# Patient Record
Sex: FEMALE | Race: BLACK OR AFRICAN AMERICAN | NOT HISPANIC OR LATINO | URBAN - METROPOLITAN AREA
[De-identification: names, ages, dates, MRNs, and addresses within clinical notes are randomized per-mention and may not be internally consistent; named-entity substitution may affect disease eponyms.]

---

## 2020-06-06 ENCOUNTER — INPATIENT (INPATIENT)
Facility: HOSPITAL | Age: 80
LOS: 5 days | Discharge: HOME | DRG: 393 | End: 2020-06-12
Attending: GENERAL PRACTICE | Admitting: GENERAL PRACTICE
Payer: MEDICARE

## 2020-06-06 VITALS
RESPIRATION RATE: 16 BRPM | SYSTOLIC BLOOD PRESSURE: 136 MMHG | WEIGHT: 169.98 LBS | OXYGEN SATURATION: 100 % | DIASTOLIC BLOOD PRESSURE: 97 MMHG | HEART RATE: 77 BPM | HEIGHT: 66 IN

## 2020-06-06 DIAGNOSIS — I24.9 ACUTE ISCHEMIC HEART DISEASE, UNSPECIFIED: ICD-10-CM

## 2020-06-06 LAB
ALBUMIN SERPL ELPH-MCNC: 2.9 G/DL — LOW (ref 3.5–5)
ALP SERPL-CCNC: 44 U/L — SIGNIFICANT CHANGE UP (ref 40–120)
ALT FLD-CCNC: 12 U/L DA — SIGNIFICANT CHANGE UP (ref 10–60)
ANION GAP SERPL CALC-SCNC: 12 MMOL/L — SIGNIFICANT CHANGE UP (ref 5–17)
APTT BLD: 37.2 SEC — HIGH (ref 27.5–36.3)
AST SERPL-CCNC: 12 U/L — SIGNIFICANT CHANGE UP (ref 10–40)
BASOPHILS # BLD AUTO: 0 K/UL — SIGNIFICANT CHANGE UP (ref 0–0.2)
BASOPHILS NFR BLD AUTO: 0 % — SIGNIFICANT CHANGE UP (ref 0–2)
BILIRUB SERPL-MCNC: 0.7 MG/DL — SIGNIFICANT CHANGE UP (ref 0.2–1.2)
BUN SERPL-MCNC: 27 MG/DL — HIGH (ref 7–18)
CALCIUM SERPL-MCNC: 7.9 MG/DL — LOW (ref 8.4–10.5)
CHLORIDE SERPL-SCNC: 112 MMOL/L — HIGH (ref 96–108)
CO2 SERPL-SCNC: 18 MMOL/L — LOW (ref 22–31)
CREAT SERPL-MCNC: 1.42 MG/DL — HIGH (ref 0.5–1.3)
EOSINOPHIL # BLD AUTO: 0.07 K/UL — SIGNIFICANT CHANGE UP (ref 0–0.5)
EOSINOPHIL NFR BLD AUTO: 1 % — SIGNIFICANT CHANGE UP (ref 0–6)
GLUCOSE SERPL-MCNC: 108 MG/DL — HIGH (ref 70–99)
HCT VFR BLD CALC: 15.5 % — CRITICAL LOW (ref 34.5–45)
HGB BLD-MCNC: 4.6 G/DL — CRITICAL LOW (ref 11.5–15.5)
INR BLD: 1.66 RATIO — HIGH (ref 0.88–1.16)
LYMPHOCYTES # BLD AUTO: 1.33 K/UL — SIGNIFICANT CHANGE UP (ref 1–3.3)
LYMPHOCYTES # BLD AUTO: 18 % — SIGNIFICANT CHANGE UP (ref 13–44)
MCHC RBC-ENTMCNC: 27.4 PG — SIGNIFICANT CHANGE UP (ref 27–34)
MCHC RBC-ENTMCNC: 29.7 GM/DL — LOW (ref 32–36)
MCV RBC AUTO: 92.3 FL — SIGNIFICANT CHANGE UP (ref 80–100)
MONOCYTES # BLD AUTO: 0.37 K/UL — SIGNIFICANT CHANGE UP (ref 0–0.9)
MONOCYTES NFR BLD AUTO: 5 % — SIGNIFICANT CHANGE UP (ref 2–14)
NEUTROPHILS # BLD AUTO: 5.62 K/UL — SIGNIFICANT CHANGE UP (ref 1.8–7.4)
NEUTROPHILS NFR BLD AUTO: 75 % — SIGNIFICANT CHANGE UP (ref 43–77)
OB PNL STL: POSITIVE
PLATELET # BLD AUTO: 213 K/UL — SIGNIFICANT CHANGE UP (ref 150–400)
POTASSIUM SERPL-MCNC: 3.6 MMOL/L — SIGNIFICANT CHANGE UP (ref 3.5–5.3)
POTASSIUM SERPL-SCNC: 3.6 MMOL/L — SIGNIFICANT CHANGE UP (ref 3.5–5.3)
PROT SERPL-MCNC: 6.8 G/DL — SIGNIFICANT CHANGE UP (ref 6–8.3)
PROTHROM AB SERPL-ACNC: 19 SEC — HIGH (ref 10–12.9)
RBC # BLD: 1.68 M/UL — LOW (ref 3.8–5.2)
RBC # FLD: 20.5 % — HIGH (ref 10.3–14.5)
SODIUM SERPL-SCNC: 142 MMOL/L — SIGNIFICANT CHANGE UP (ref 135–145)
TROPONIN I SERPL-MCNC: 0.05 NG/ML — HIGH (ref 0–0.04)
WBC # BLD: 7.4 K/UL — SIGNIFICANT CHANGE UP (ref 3.8–10.5)
WBC # FLD AUTO: 7.4 K/UL — SIGNIFICANT CHANGE UP (ref 3.8–10.5)

## 2020-06-06 PROCEDURE — 71045 X-RAY EXAM CHEST 1 VIEW: CPT | Mod: 26

## 2020-06-06 PROCEDURE — 70450 CT HEAD/BRAIN W/O DYE: CPT | Mod: 26

## 2020-06-06 PROCEDURE — 99291 CRITICAL CARE FIRST HOUR: CPT

## 2020-06-06 RX ORDER — PANTOPRAZOLE SODIUM 20 MG/1
40 TABLET, DELAYED RELEASE ORAL ONCE
Refills: 0 | Status: COMPLETED | OUTPATIENT
Start: 2020-06-06 | End: 2020-06-06

## 2020-06-06 RX ORDER — PANTOPRAZOLE SODIUM 20 MG/1
40 TABLET, DELAYED RELEASE ORAL
Refills: 0 | Status: DISCONTINUED | OUTPATIENT
Start: 2020-06-06 | End: 2020-06-09

## 2020-06-06 RX ADMIN — PANTOPRAZOLE SODIUM 40 MILLIGRAM(S): 20 TABLET, DELAYED RELEASE ORAL at 22:08

## 2020-06-06 NOTE — H&P ADULT - PROBLEM SELECTOR PLAN 1
p/w generalized weakness with dizziness  H/H 4.6/15.5, black stool on rectal exam, FOBT positive, pt. on eliquis  INR 1.66  likely anemia due to UGIB  on  clears   IV Protonix BID  will Transfuse 2 units of [facility]  CBC q6  GI Consult Dr Henry p/w generalized weakness with dizziness  H/H 4.6/15.5, black stool on rectal exam, FOBT positive, pt. on eliquis  INR 1.66  no recent use of steroid, NSAID, weight loss  likely anemia due to UGIB  on  clears   IV Protonix BID  will Transfuse 2 units of [facility]  CBC q6  GI Consult Dr Henry p/w generalized weakness with dizziness  H/H 4.6/15.5, black stool on rectal exam, FOBT positive, pt. on eliquis  INR 1.66  no recent use of steroid, NSAID, weight loss  likely anemia due to UGIB  on  clears   IV Protonix BID  will Transfuse 2 units of [facility]  CBC q6  GI Consult Dr Romano

## 2020-06-06 NOTE — ED PROVIDER NOTE - PROGRESS NOTE DETAILS
Pt with gi bleed, anemia, abnormal ekg with elevated trop, will transfuse and admit. Pt had slurred speech that has resolved, likely related to anemia/weakness, on eliquis, so not TPA candidate.

## 2020-06-06 NOTE — H&P ADULT - PROBLEM SELECTOR PLAN 8
IMPROVE VTE Individual Risk Assessment  RISK                                                                Points  [  ] Previous VTE                                                  3  [  ] Thrombophilia                                               2  [  ] Lower limb paralysis                                      2        (unable to hold up >15 seconds)    [  ] Current Cancer                                              2         (within 6 months)  [x  ] Immobilization > 24 hrs                                1  [  ] ICU/CCU stay > 24 hours                              1  [x  ] Age > 60                                                      1  IMPROVE VTE Score _________2, -hold DVT proph due to anemia, SCDs

## 2020-06-06 NOTE — H&P ADULT - PROBLEM SELECTOR PLAN 6
Home meds: multaq, diltiazem  hold meds due to low Hb  monitor BP and adjust meds Home meds: multaq, diltiazem  c/w multaq and diltiazem with parameters  monitor BP and adjust meds

## 2020-06-06 NOTE — H&P ADULT - NSHPPHYSICALEXAM_GEN_ALL_CORE
Vital Signs Last 24 Hrs  T(C): --  T(F): --  HR: 76 (06 Jun 2020 21:59) (76 - 77)  BP: 145/65 (06 Jun 2020 21:59) (136/97 - 145/65)  BP(mean): --  RR: 20 (06 Jun 2020 21:59) (16 - 20)  SpO2: 98% (06 Jun 2020 21:59) (98% - 100%)    PHYSICAL EXAM:  GENERAL: NAD  HEENT: Normocephalic;  conjunctivae and sclerae clear; moist mucous membranes;   NECK : supple  CHEST/LUNG: Clear to auscultation bilaterally with good air entry   HEART: S1 S2  regular; no murmurs, gallops or rubs  ABDOMEN: Soft, Nontender, Nondistended; Bowel sounds present  EXTREMITIES: no cyanosis; no edema; no calf tenderness  SKIN: warm and dry; no rash  NERVOUS SYSTEM:  Awake and alert; Oriented  to place and person ; no new deficits

## 2020-06-06 NOTE — H&P ADULT - HISTORY OF PRESENT ILLNESS
80F from home, lives alone, PMH of short-term memory loss, A.fib, HTN, HLD presented to ED c/o weakness. Pt. is AAO X2 and complaints of generalized weakness and dizziness on walking. States she is always anemic and ha snot noticed any active bleeding or black stool. Also states that she is not taking any medications since long time. She endorses occasional shortness of breath and states she has had since childhood.  Spoke to daughter, Christina over phone. She lives in the same building with her mother and takes care of her, she gives her medications regularly. She states pt. was not feeling good since yesterday with loss of appetite and generalized weakness, which progressively got worst today, hence she called ambulance. When EMS arrived, they were suspicious of slurring of speech but daughter mentions that she did not have any slurring of speech, she was just weak. 80F from home, lives alone, PMH of short-term memory loss, A.fib, HTN, HLD presented to ED c/o weakness. Pt. is AAO X2 and complaints of generalized weakness and dizziness on walking. States she is always anemic and ha snot noticed any active bleeding or black stool. Also states that she is not taking any medications since long time. She endorses occasional shortness of breath and states she has had since childhood.  Spoke to daughter, Christina over phone. She lives in the same building with her mother and takes care of her, she gives her medications regularly. She states pt. was not feeling good since yesterday with loss of appetite and generalized weakness, which progressively got worst today, hence she called ambulance. When EMS arrived, they were suspicious of slurring of speech but daughter mentions that she did not have any slurring of speech, she was just weak. On further questioning, she states she did notice dark stool X 1 epsiode today, otherwise no prior active bleeding or black stool. Denies any numbness, tingling, weakness of extremities, difficulty swallowing, urinary complaints. 80F from home, lives alone, PMH of short-term memory loss, A.fib, HTN, HLD presented to ED c/o weakness. Pt. is AAO X2 and complaints of generalized weakness and dizziness on walking. States she is always anemic and has not noticed any active bleeding or black stool. Also states that she is not taking any medications since long time. She endorses occasional shortness of breath and states she has had since childhood.  Spoke to daughter, Christina over phone. She lives in the same building with her mother and takes care of her, she gives her medications regularly. She states pt. was not feeling good since yesterday with loss of appetite and generalized weakness, which progressively got worst today, hence she called ambulance. When EMS arrived, they were suspicious of slurring of speech but daughter mentions that she did not have any slurring of speech, she was just weak. On further questioning, she states she did notice dark stool X 1 epsiode today, otherwise no prior active bleeding or black stool. Denies any numbness, tingling, weakness of extremities, difficulty swallowing, urinary complaints.

## 2020-06-06 NOTE — ED PROVIDER NOTE - CARE PLAN
Principal Discharge DX:	ACS (acute coronary syndrome)  Secondary Diagnosis:	Anemia  Secondary Diagnosis:	TIA (transient ischemic attack)

## 2020-06-06 NOTE — ED PROVIDER NOTE - CLINICAL SUMMARY MEDICAL DECISION MAKING FREE TEXT BOX
79 y/o F presents with stroke like symptoms. Symptoms have improved. Patient not a tPA candidate due to improving symptoms and on Eliquis. Stroke code initiated.

## 2020-06-06 NOTE — H&P ADULT - PROBLEM SELECTOR PLAN 3
p/w BUN/Cr 27/1.42  daughter denies any renal issue before  likely pre-renal due to anemia  will transfuse PRBC  FU urine lytes, renal function

## 2020-06-06 NOTE — ED PROVIDER NOTE - OBJECTIVE STATEMENT
79 y/o F with a significant PMHx of atrial fibrillation, HTN, HLD and DM is brought in by EMS to the ED for dizziness and weakness all day. Per EMS, daughter states that the patient had slurred speech, which has since resolved. Patient on Eliquis, however is not compliant with medications and has been "fuzzy about taking them". Patient with decreased appetite for the past 2 days and is A&O x2 at baseline.

## 2020-06-06 NOTE — H&P ADULT - ASSESSMENT
80F from home, lives alone, PMH of short-term memory loss, A.fib, HTN, HLD presented to ED c/o weakness. Admitted for severe symptomatic anemia likely 2/2 UGIB, ARF, NSTEMI.    ED course: Vitals: BP: 145/65 HR: 76  RR: 20 SaO2: 98% on RA  Labs significant for H/H 4.6/15.5, FOBT positive, INR 1.66, BUN/Cr 27/1.42, T1 0.053  Radiological findings- CT head shows No acute intracranial hemorrhage or vasogenic edema. Grossly stable mild chronic microvascular changes.  s/p protonix 40  GOC: FULL CODE 80F from home, lives alone, PMH of short-term memory loss, A.fib, HTN, HLD presented to ED c/o weakness. Admitted for severe symptomatic anemia likely 2/2 UGIB, ARF, NSTEMI.    ED course: Vitals: BP: 145/65 HR: 76  RR: 20 SaO2: 98% on RA  Labs significant for H/H 4.6/15.5, FOBT positive, INR 1.66, BUN/Cr 27/1.42, T1 0.053  Radiological findings- CT head shows No acute intracranial hemorrhage or vasogenic edema. Grossly stable mild chronic microvascular changes.  s/p protonix 40  GOC: FULL CODE    Off note, code stroke was called in ED as EMS was concerned for slurring of speech but as per daughter, pt. did not have slurred speech and was only weak.

## 2020-06-06 NOTE — H&P ADULT - NSICDXPASTMEDICALHX_GEN_ALL_CORE_FT
PAST MEDICAL HISTORY:  Atrial fibrillation     DM (diabetes mellitus)     HLD (hyperlipidemia)     HTN (hypertension)

## 2020-06-06 NOTE — H&P ADULT - ATTENDING COMMENTS
Patient evaluated, case discussed with me, chart reviewed, agree with above H/P as reviewed and edited by me.  Dr. DAKOTA Coulter ([phone])

## 2020-06-06 NOTE — H&P ADULT - PROBLEM SELECTOR PLAN 4
EKG shows NSR with 1st degree block, QTc 483, ST depression with T inv. in V4, V5, V6  likely due Pt. asymptomatic  T1 0.053  EKG shows NSR with 1st degree block, QTc 483, ST depression with T inv. in V4, V5, V6  likely due to severe anemia  tele monitor  will hold anti-platelet due to low Hb with FOBT positive  FU T2, T3  FU ECHO   Cardio consult Dr. Henry Pt. asymptomatic  T1 0.053  EKG shows NSR with 1st degree block, QTc 483, ST depression with T inv. in V4, V5, V6  likely due to severe anemia  tele monitor  will hold anti-platelet due to low Hb with FOBT positive  FU T2, T3  FU ECHO   Cardio consult Dr. Henry  will repeat EKG after transfusion Pt. asymptomatic  T1 0.053  EKG shows NSR with 1st degree block, QTc 483, ST depression with T inv. in V4, V5, V6  likely due to severe anemia  tele monitor  will hold anti-platelet due to low Hb with FOBT positive  FU T2, T3  FU ECHO

## 2020-06-07 DIAGNOSIS — I21.4 NON-ST ELEVATION (NSTEMI) MYOCARDIAL INFARCTION: ICD-10-CM

## 2020-06-07 DIAGNOSIS — R19.5 OTHER FECAL ABNORMALITIES: ICD-10-CM

## 2020-06-07 DIAGNOSIS — E78.5 HYPERLIPIDEMIA, UNSPECIFIED: ICD-10-CM

## 2020-06-07 DIAGNOSIS — Z29.9 ENCOUNTER FOR PROPHYLACTIC MEASURES, UNSPECIFIED: ICD-10-CM

## 2020-06-07 DIAGNOSIS — I10 ESSENTIAL (PRIMARY) HYPERTENSION: ICD-10-CM

## 2020-06-07 DIAGNOSIS — D64.9 ANEMIA, UNSPECIFIED: ICD-10-CM

## 2020-06-07 DIAGNOSIS — N17.9 ACUTE KIDNEY FAILURE, UNSPECIFIED: ICD-10-CM

## 2020-06-07 DIAGNOSIS — I48.91 UNSPECIFIED ATRIAL FIBRILLATION: ICD-10-CM

## 2020-06-07 LAB
24R-OH-CALCIDIOL SERPL-MCNC: 12.6 NG/ML — LOW (ref 30–80)
A1C WITH ESTIMATED AVERAGE GLUCOSE RESULT: 5.4 % — SIGNIFICANT CHANGE UP (ref 4–5.6)
ALBUMIN SERPL ELPH-MCNC: 3 G/DL — LOW (ref 3.5–5)
ALP SERPL-CCNC: 43 U/L — SIGNIFICANT CHANGE UP (ref 40–120)
ALT FLD-CCNC: 11 U/L DA — SIGNIFICANT CHANGE UP (ref 10–60)
ANION GAP SERPL CALC-SCNC: 11 MMOL/L — SIGNIFICANT CHANGE UP (ref 5–17)
APPEARANCE UR: CLEAR — SIGNIFICANT CHANGE UP
APTT BLD: 32 SEC — SIGNIFICANT CHANGE UP (ref 27.5–36.3)
AST SERPL-CCNC: 13 U/L — SIGNIFICANT CHANGE UP (ref 10–40)
BASOPHILS # BLD AUTO: 0.03 K/UL — SIGNIFICANT CHANGE UP (ref 0–0.2)
BASOPHILS # BLD AUTO: 0.04 K/UL — SIGNIFICANT CHANGE UP (ref 0–0.2)
BASOPHILS NFR BLD AUTO: 0.4 % — SIGNIFICANT CHANGE UP (ref 0–2)
BASOPHILS NFR BLD AUTO: 0.5 % — SIGNIFICANT CHANGE UP (ref 0–2)
BILIRUB SERPL-MCNC: 0.9 MG/DL — SIGNIFICANT CHANGE UP (ref 0.2–1.2)
BILIRUB UR-MCNC: NEGATIVE — SIGNIFICANT CHANGE UP
BUN SERPL-MCNC: 22 MG/DL — HIGH (ref 7–18)
CALCIUM SERPL-MCNC: 7.9 MG/DL — LOW (ref 8.4–10.5)
CHLORIDE SERPL-SCNC: 110 MMOL/L — HIGH (ref 96–108)
CHOLEST SERPL-MCNC: 76 MG/DL — SIGNIFICANT CHANGE UP (ref 10–199)
CK MB BLD-MCNC: 0.6 % — SIGNIFICANT CHANGE UP (ref 0–3.5)
CK MB CFR SERPL CALC: 1.6 NG/ML — SIGNIFICANT CHANGE UP (ref 0–3.6)
CK SERPL-CCNC: 264 U/L — HIGH (ref 21–215)
CO2 SERPL-SCNC: 19 MMOL/L — LOW (ref 22–31)
COLOR SPEC: YELLOW — SIGNIFICANT CHANGE UP
CREAT ?TM UR-MCNC: 145 MG/DL — SIGNIFICANT CHANGE UP
CREAT SERPL-MCNC: 1.37 MG/DL — HIGH (ref 0.5–1.3)
DIFF PNL FLD: NEGATIVE — SIGNIFICANT CHANGE UP
EOSINOPHIL # BLD AUTO: 0.04 K/UL — SIGNIFICANT CHANGE UP (ref 0–0.5)
EOSINOPHIL # BLD AUTO: 0.19 K/UL — SIGNIFICANT CHANGE UP (ref 0–0.5)
EOSINOPHIL NFR BLD AUTO: 0.6 % — SIGNIFICANT CHANGE UP (ref 0–6)
EOSINOPHIL NFR BLD AUTO: 2.5 % — SIGNIFICANT CHANGE UP (ref 0–6)
ESTIMATED AVERAGE GLUCOSE: 108 MG/DL — SIGNIFICANT CHANGE UP (ref 68–114)
FERRITIN SERPL-MCNC: 10 NG/ML — LOW (ref 15–150)
GLUCOSE SERPL-MCNC: 135 MG/DL — HIGH (ref 70–99)
GLUCOSE UR QL: NEGATIVE — SIGNIFICANT CHANGE UP
HCT VFR BLD CALC: 21.9 % — LOW (ref 34.5–45)
HCT VFR BLD CALC: 23.1 % — LOW (ref 34.5–45)
HCT VFR BLD CALC: 25 % — LOW (ref 34.5–45)
HDLC SERPL-MCNC: 37 MG/DL — LOW
HGB BLD-MCNC: 6.9 G/DL — CRITICAL LOW (ref 11.5–15.5)
HGB BLD-MCNC: 7.2 G/DL — LOW (ref 11.5–15.5)
HGB BLD-MCNC: 8 G/DL — LOW (ref 11.5–15.5)
IMM GRANULOCYTES NFR BLD AUTO: 0.3 % — SIGNIFICANT CHANGE UP (ref 0–1.5)
IMM GRANULOCYTES NFR BLD AUTO: 0.4 % — SIGNIFICANT CHANGE UP (ref 0–1.5)
INR BLD: 1.36 RATIO — HIGH (ref 0.88–1.16)
IRON SATN MFR SERPL: 25 UG/DL — LOW (ref 40–150)
IRON SATN MFR SERPL: 9 % — LOW (ref 15–50)
KETONES UR-MCNC: NEGATIVE — SIGNIFICANT CHANGE UP
LEUKOCYTE ESTERASE UR-ACNC: NEGATIVE — SIGNIFICANT CHANGE UP
LIPID PNL WITH DIRECT LDL SERPL: 25 MG/DL — SIGNIFICANT CHANGE UP
LYMPHOCYTES # BLD AUTO: 1.8 K/UL — SIGNIFICANT CHANGE UP (ref 1–3.3)
LYMPHOCYTES # BLD AUTO: 1.84 K/UL — SIGNIFICANT CHANGE UP (ref 1–3.3)
LYMPHOCYTES # BLD AUTO: 24.4 % — SIGNIFICANT CHANGE UP (ref 13–44)
LYMPHOCYTES # BLD AUTO: 26 % — SIGNIFICANT CHANGE UP (ref 13–44)
MAGNESIUM SERPL-MCNC: 2.6 MG/DL — SIGNIFICANT CHANGE UP (ref 1.6–2.6)
MCHC RBC-ENTMCNC: 28 PG — SIGNIFICANT CHANGE UP (ref 27–34)
MCHC RBC-ENTMCNC: 28.1 PG — SIGNIFICANT CHANGE UP (ref 27–34)
MCHC RBC-ENTMCNC: 28.7 PG — SIGNIFICANT CHANGE UP (ref 27–34)
MCHC RBC-ENTMCNC: 31.2 GM/DL — LOW (ref 32–36)
MCHC RBC-ENTMCNC: 31.5 GM/DL — LOW (ref 32–36)
MCHC RBC-ENTMCNC: 32 GM/DL — SIGNIFICANT CHANGE UP (ref 32–36)
MCV RBC AUTO: 89 FL — SIGNIFICANT CHANGE UP (ref 80–100)
MCV RBC AUTO: 89.6 FL — SIGNIFICANT CHANGE UP (ref 80–100)
MCV RBC AUTO: 90.2 FL — SIGNIFICANT CHANGE UP (ref 80–100)
MONOCYTES # BLD AUTO: 0.32 K/UL — SIGNIFICANT CHANGE UP (ref 0–0.9)
MONOCYTES # BLD AUTO: 0.66 K/UL — SIGNIFICANT CHANGE UP (ref 0–0.9)
MONOCYTES NFR BLD AUTO: 4.6 % — SIGNIFICANT CHANGE UP (ref 2–14)
MONOCYTES NFR BLD AUTO: 8.8 % — SIGNIFICANT CHANGE UP (ref 2–14)
NEUTROPHILS # BLD AUTO: 4.7 K/UL — SIGNIFICANT CHANGE UP (ref 1.8–7.4)
NEUTROPHILS # BLD AUTO: 4.79 K/UL — SIGNIFICANT CHANGE UP (ref 1.8–7.4)
NEUTROPHILS NFR BLD AUTO: 63.5 % — SIGNIFICANT CHANGE UP (ref 43–77)
NEUTROPHILS NFR BLD AUTO: 68 % — SIGNIFICANT CHANGE UP (ref 43–77)
NITRITE UR-MCNC: NEGATIVE — SIGNIFICANT CHANGE UP
NRBC # BLD: 0 /100 WBCS — SIGNIFICANT CHANGE UP (ref 0–0)
PH UR: 5 — SIGNIFICANT CHANGE UP (ref 5–8)
PHOSPHATE SERPL-MCNC: 2.4 MG/DL — LOW (ref 2.5–4.5)
PLATELET # BLD AUTO: 165 K/UL — SIGNIFICANT CHANGE UP (ref 150–400)
PLATELET # BLD AUTO: 172 K/UL — SIGNIFICANT CHANGE UP (ref 150–400)
PLATELET # BLD AUTO: 173 K/UL — SIGNIFICANT CHANGE UP (ref 150–400)
POTASSIUM SERPL-MCNC: 3.1 MMOL/L — LOW (ref 3.5–5.3)
POTASSIUM SERPL-SCNC: 3.1 MMOL/L — LOW (ref 3.5–5.3)
PROT SERPL-MCNC: 6.6 G/DL — SIGNIFICANT CHANGE UP (ref 6–8.3)
PROT UR-MCNC: 30 MG/DL
PROTHROM AB SERPL-ACNC: 15.5 SEC — HIGH (ref 10–12.9)
RBC # BLD: 2.46 M/UL — LOW (ref 3.8–5.2)
RBC # BLD: 2.56 M/UL — LOW (ref 3.8–5.2)
RBC # BLD: 2.79 M/UL — LOW (ref 3.8–5.2)
RBC # FLD: 17.7 % — HIGH (ref 10.3–14.5)
RBC # FLD: 17.7 % — HIGH (ref 10.3–14.5)
RBC # FLD: 17.9 % — HIGH (ref 10.3–14.5)
SARS-COV-2 RNA SPEC QL NAA+PROBE: SIGNIFICANT CHANGE UP
SODIUM SERPL-SCNC: 140 MMOL/L — SIGNIFICANT CHANGE UP (ref 135–145)
SODIUM UR-SCNC: 21 MMOL/L — SIGNIFICANT CHANGE UP
SP GR SPEC: 1.02 — SIGNIFICANT CHANGE UP (ref 1.01–1.02)
TIBC SERPL-MCNC: 289 UG/DL — SIGNIFICANT CHANGE UP (ref 250–450)
TOTAL CHOLESTEROL/HDL RATIO MEASUREMENT: 2.1 RATIO — LOW (ref 3.3–7.1)
TRIGL SERPL-MCNC: 69 MG/DL — SIGNIFICANT CHANGE UP (ref 10–149)
TROPONIN I SERPL-MCNC: 0.05 NG/ML — HIGH (ref 0–0.04)
TSH SERPL-MCNC: 0.98 UU/ML — SIGNIFICANT CHANGE UP (ref 0.34–4.82)
UIBC SERPL-MCNC: 264 UG/DL — SIGNIFICANT CHANGE UP (ref 110–370)
UROBILINOGEN FLD QL: NEGATIVE — SIGNIFICANT CHANGE UP
VIT B12 SERPL-MCNC: 357 PG/ML — SIGNIFICANT CHANGE UP (ref 232–1245)
WBC # BLD: 6.92 K/UL — SIGNIFICANT CHANGE UP (ref 3.8–10.5)
WBC # BLD: 7.16 K/UL — SIGNIFICANT CHANGE UP (ref 3.8–10.5)
WBC # BLD: 7.54 K/UL — SIGNIFICANT CHANGE UP (ref 3.8–10.5)
WBC # FLD AUTO: 6.92 K/UL — SIGNIFICANT CHANGE UP (ref 3.8–10.5)
WBC # FLD AUTO: 7.16 K/UL — SIGNIFICANT CHANGE UP (ref 3.8–10.5)
WBC # FLD AUTO: 7.54 K/UL — SIGNIFICANT CHANGE UP (ref 3.8–10.5)

## 2020-06-07 RX ORDER — DILTIAZEM HCL 120 MG
180 CAPSULE, EXT RELEASE 24 HR ORAL DAILY
Refills: 0 | Status: DISCONTINUED | OUTPATIENT
Start: 2020-06-07 | End: 2020-06-09

## 2020-06-07 RX ORDER — POTASSIUM CHLORIDE 20 MEQ
40 PACKET (EA) ORAL EVERY 4 HOURS
Refills: 0 | Status: COMPLETED | OUTPATIENT
Start: 2020-06-07 | End: 2020-06-07

## 2020-06-07 RX ORDER — DILTIAZEM HCL 120 MG
360 CAPSULE, EXT RELEASE 24 HR ORAL DAILY
Refills: 0 | Status: DISCONTINUED | OUTPATIENT
Start: 2020-06-07 | End: 2020-06-07

## 2020-06-07 RX ORDER — ATORVASTATIN CALCIUM 80 MG/1
80 TABLET, FILM COATED ORAL AT BEDTIME
Refills: 0 | Status: DISCONTINUED | OUTPATIENT
Start: 2020-06-07 | End: 2020-06-09

## 2020-06-07 RX ORDER — SODIUM,POTASSIUM PHOSPHATES 278-250MG
1 POWDER IN PACKET (EA) ORAL
Refills: 0 | Status: COMPLETED | OUTPATIENT
Start: 2020-06-07 | End: 2020-06-08

## 2020-06-07 RX ORDER — DRONEDARONE 400 MG/1
400 TABLET, FILM COATED ORAL
Refills: 0 | Status: DISCONTINUED | OUTPATIENT
Start: 2020-06-07 | End: 2020-06-07

## 2020-06-07 RX ORDER — ERGOCALCIFEROL 1.25 MG/1
50000 CAPSULE ORAL
Refills: 0 | Status: DISCONTINUED | OUTPATIENT
Start: 2020-06-07 | End: 2020-06-09

## 2020-06-07 RX ORDER — DRONEDARONE 400 MG/1
400 TABLET, FILM COATED ORAL
Refills: 0 | Status: DISCONTINUED | OUTPATIENT
Start: 2020-06-07 | End: 2020-06-09

## 2020-06-07 RX ADMIN — Medication 40 MILLIEQUIVALENT(S): at 15:08

## 2020-06-07 RX ADMIN — DRONEDARONE 400 MILLIGRAM(S): 400 TABLET, FILM COATED ORAL at 17:29

## 2020-06-07 RX ADMIN — Medication 40 MILLIEQUIVALENT(S): at 17:29

## 2020-06-07 RX ADMIN — Medication 1 PACKET(S): at 17:33

## 2020-06-07 RX ADMIN — PANTOPRAZOLE SODIUM 40 MILLIGRAM(S): 20 TABLET, DELAYED RELEASE ORAL at 06:36

## 2020-06-07 RX ADMIN — ATORVASTATIN CALCIUM 80 MILLIGRAM(S): 80 TABLET, FILM COATED ORAL at 21:32

## 2020-06-07 RX ADMIN — PANTOPRAZOLE SODIUM 40 MILLIGRAM(S): 20 TABLET, DELAYED RELEASE ORAL at 17:29

## 2020-06-07 RX ADMIN — DRONEDARONE 400 MILLIGRAM(S): 400 TABLET, FILM COATED ORAL at 13:19

## 2020-06-07 NOTE — CHART NOTE - NSCHARTNOTEFT_GEN_A_CORE
Reviewed case with admitting resident Dr. Luis Guan during morning sign-out. Agreed to reduce original dose to half-dose cardizem 180CD for now in light of possible upper GI bleed. Will continue to monitor vitals. Reviewed case with admitting resident Dr. Luis Guan during morning sign-out. Agreed to reduce original dose to half-dose cardizem 180CD for now in light of possible upper GI bleed. s/p 2U PRBC transfusion with appropriate rise in hemoglobin. Eliquis and aspirin on hold. Will continue to monitor vitals.

## 2020-06-07 NOTE — CHART NOTE - NSCHARTNOTEFT_GEN_A_CORE
Writer was paged by JACIEL South that patient buckled to knees and fell in her room.     Patient was adamant she did not hit her head and that she fell to her knees and got back up. Confirmed by nursing staff.     Patient's vitals were unremarkable and neuro exam was performed by writer. CN 2-12 were intact.     Action:   1) Patient instructed to ask for assistance prior to getting out of bed.     Primary team to follow up in the am. Writer was paged by JACIEL South that patient buckled to knees and fell in her room.     Patient was adamant she did not hit her head and that she fell to her knees and got back up. Confirmed by nursing staff.     Patient's vitals were unremarkable and neuro exam was performed by writer. CN 2-12 were intact.     Action:   1) Patient instructed to ask for assistance prior to getting out of bed.   2) Orthostatics per routine    Primary team to follow up in the am.

## 2020-06-07 NOTE — CONSULT NOTE ADULT - ASSESSMENT
80F from home, lives alone, PMH of short-term memory loss, A.fib, HTN, HLD presented to ED c/o weakness. Pt. is AAO X2 and complaints of generalized weakness and dizziness on walking. States she is always anemic and has not noticed any active bleeding or black stool. Also states that she is not taking any medications since long time. She endorses occasional shortness of breath and states she has had since childhood.  Spoke to daughter, Christina over phone. She lives in the same building with her mother and takes care of her, she gives her medications regularly. She states pt. was not feeling good since yesterday with loss of appetite and generalized weakness, which progressively got worst today, hence she called ambulance. When EMS arrived, they were suspicious of slurring of speech but daughter mentions that she did not have any slurring of speech, she was just weak. On further questioning, she states she did notice dark stool X 1 epsiode today, otherwise no prior active bleeding or black stool. Denies any numbness, tingling, weakness of extremities, difficulty swallowing, urinary complaints.  check orthostatic  observe on tele  echo  asa daily  PAF on no Ac?? continue Cardizem and Multaq  ?brain MRI

## 2020-06-07 NOTE — CONSULT NOTE ADULT - SUBJECTIVE AND OBJECTIVE BOX
CHIEF COMPLAINT:Patient is a 80y old  Female who presents with a chief complaint of Weakness (06 Jun 2020 23:54)      HPI:  80F from home, lives alone, PMH of short-term memory loss, A.fib, HTN, HLD presented to ED c/o weakness. Pt. is AAO X2 and complaints of generalized weakness and dizziness on walking. States she is always anemic and has not noticed any active bleeding or black stool. Also states that she is not taking any medications since long time. She endorses occasional shortness of breath and states she has had since childhood.  Spoke to daughter, Christina over phone. She lives in the same building with her mother and takes care of her, she gives her medications regularly. She states pt. was not feeling good since yesterday with loss of appetite and generalized weakness, which progressively got worst today, hence she called ambulance. When EMS arrived, they were suspicious of slurring of speech but daughter mentions that she did not have any slurring of speech, she was just weak. On further questioning, she states she did notice dark stool X 1 epsiode today, otherwise no prior active bleeding or black stool. Denies any numbness, tingling, weakness of extremities, difficulty swallowing, urinary complaints. (06 Jun 2020 23:54)      PAST MEDICAL & SURGICAL HISTORY:  DM (diabetes mellitus)  HLD (hyperlipidemia)  HTN (hypertension)  Atrial fibrillation      MEDICATIONS  (STANDING):  atorvastatin 80 milliGRAM(s) Oral at bedtime  diltiazem    milliGRAM(s) Oral daily  dronedarone 400 milliGRAM(s) Oral two times a day  pantoprazole  Injectable 40 milliGRAM(s) IV Push two times a day    MEDICATIONS  (PRN):      FAMILY HISTORY:      SOCIAL HISTORY:    [ ] Non-smoker  [ ] Smoker  [ ] Alcohol    Allergies    No Known Allergies    Intolerances    	    REVIEW OF SYSTEMS:  CONSTITUTIONAL: No fever, weight loss, or fatigue  EYES: No eye pain, visual disturbances, or discharge  ENT:  No difficulty hearing, tinnitus, vertigo; No sinus or throat pain  NECK: No pain or stiffness  RESPIRATORY: No cough, wheezing, chills or hemoptysis; No Shortness of Breath  CARDIOVASCULAR: No chest pain, palpitations, passing out, dizziness, or leg swelling  GASTROINTESTINAL: No abdominal or epigastric pain. No nausea, vomiting, or hematemesis; No diarrhea or constipation. No melena or hematochezia.  GENITOURINARY: No dysuria, frequency, hematuria, or incontinence  NEUROLOGICAL: No headaches, memory loss, loss of strength, numbness, or tremors, +dizziness  SKIN: No itching, burning, rashes, or lesions   LYMPH Nodes: No enlarged glands  ENDOCRINE: No heat or cold intolerance; No hair loss  MUSCULOSKELETAL: No joint pain or swelling; No muscle, back, or extremity pain  PSYCHIATRIC: No depression, anxiety, mood swings, or difficulty sleeping  HEME/LYMPH: No easy bruising, or bleeding gums  ALLERGY AND IMMUNOLOGIC: No hives or eczema	    [ ] All others negative	  [ ] Unable to obtain    PHYSICAL EXAM:  T(C): 36.9 (06-07-20 @ 07:52), Max: 36.9 (06-07-20 @ 07:52)  HR: 75 (06-07-20 @ 07:52) (75 - 80)  BP: 141/74 (06-07-20 @ 07:52) (136/97 - 152/73)  RR: 18 (06-07-20 @ 07:52) (15 - 20)  SpO2: 97% (06-07-20 @ 07:52) (97% - 100%)  Wt(kg): --  I&O's Summary      Appearance: Normal	  HEENT:   Normal oral mucosa, PERRL, EOMI	  Lymphatic: No lymphadenopathy  Cardiovascular: Normal S1 S2, No JVD, + murmurs, No edema  Respiratory: Lungs clear to auscultation	  Psychiatry: A & O x 3, Mood & affect appropriate  Gastrointestinal:  Soft, Non-tender, + BS	  Skin: No rashes, No ecchymoses, No cyanosis	  Neurologic: Non-focal  Extremities: Normal range of motion, No clubbing, cyanosis or edema  Vascular: Peripheral pulses palpable 2+ bilaterally    TELEMETRY: 	    ECG:  	  RADIOLOGY:  OTHER: 	  	  LABS:	 	    CARDIAC MARKERS:  CARDIAC MARKERS ( 07 Jun 2020 03:36 )  0.047 ng/mL / x     / 264 U/L / x     / 1.6 ng/mL  CARDIAC MARKERS ( 06 Jun 2020 21:30 )  0.053 ng/mL / x     / x     / x     / x                                  7.2    6.92  )-----------( 172      ( 07 Jun 2020 09:35 )             23.1     06-07    140  |  110<H>  |  22<H>  ----------------------------<  135<H>  3.1<L>   |  19<L>  |  1.37<H>    Ca    7.9<L>      07 Jun 2020 09:35  Phos  2.4     06-07  Mg     2.6     06-07    TPro  6.6  /  Alb  3.0<L>  /  TBili  0.9  /  DBili  x   /  AST  13  /  ALT  11  /  AlkPhos  43  06-07    proBNP:   Lipid Profile: Cholesterol 76  LDL 25  HDL 37  TG 69    HgA1c:   TSH: Thyroid Stimulating Hormone, Serum: 0.98 uU/mL (06-07 @ 09:35)    PT/INR - ( 07 Jun 2020 09:35 )   PT: 15.5 sec;   INR: 1.36 ratio         PTT - ( 07 Jun 2020 09:35 )  PTT:32.0 sec    PREVIOUS DIAGNOSTIC TESTING:      < from: Xray Chest 1 View- PORTABLE-Urgent (06.06.20 @ 21:33) >  Nonspecific small nodular opacity at the right lung base, likely decreased since 7/21/2013. Follow-up may be obtained for further evaluation.    < end of copied text >      < from: CT Brain Stroke Protocol (06.06.20 @ 21:30) >  IMPRESSION:  No acute intracranial hemorrhage or vasogenic edema. Grossly stable mild chronic microvascular changes.      < end of copied text >

## 2020-06-07 NOTE — CONSULT NOTE ADULT - ASSESSMENT
80 year old female with severe symptomatic anemia     Anemia   I explained to the patient that she will need and EGD and colonoscopy to evaluate for bleeding. She adamantly refuses. She seems to hae insight however I will call daughter to discuss.   Monitor CBC   PPI daily   Advance diet today   Clears tomorrow   Possible endoscopic eval Tuesday if patient agrees   Advanced care planning was discussed with patient and family.  Advanced care planning forms were reviewed and discussed.  Risks, benefits and alternatives of gastroenterologic procedures were discussed in detail and all questions were answered.   I was physically present for the key portions of the evaluation and management (E/M) service provided.  The patient was personally seen and examined at bedside.    Thank you for your consultation and allowing  me to participate in the care of your patients. If you have further questions please contact me at [phone].     Jose Dozier M.D.       _________________________________________________________________________________________________  [facility]  [address] [place] [address]  Office: [phone]    Ron Alejandro PA-C  ___________________________________________________________________________________________________

## 2020-06-07 NOTE — CHART NOTE - NSCHARTNOTEFT_GEN_A_CORE
EVENT: Reviewed repeat labs    SUBJECTIVE: Patient reports feeling "little dizzy" but improved since admission. No other c/o voiced.     OBJECTIVE:  Vital Signs Last 24 Hrs  T(C): 36.9 (07 Jun 2020 07:52), Max: 36.9 (07 Jun 2020 07:52)  T(F): 98.5 (07 Jun 2020 07:52), Max: 98.5 (07 Jun 2020 07:52)  HR: 75 (07 Jun 2020 07:52) (75 - 80)  BP: 141/74 (07 Jun 2020 07:52) (136/97 - 152/73)  BP(mean): --  RR: 18 (07 Jun 2020 07:52) (15 - 20)  SpO2: 97% (07 Jun 2020 07:52) (97% - 100%)    LABS:                        7.2    6.92  )-----------( 172      ( 07 Jun 2020 09:35 )             23.1   CARDIAC MARKERS ( 07 Jun 2020 03:36 )  0.047 ng/mL / x     / 264 U/L / x     / 1.6 ng/mL  CARDIAC MARKERS ( 06 Jun 2020 21:30 )  0.053 ng/mL / x     / x     / x     / x        06-07    140  |  110<H>  |  22<H>  ----------------------------<  135<H>  3.1<L>   |  19<L>  |  1.37<H>    Ca    7.9<L>      07 Jun 2020 09:35  Phos  2.4     06-07  Mg     2.6     06-07    TPro  6.6  /  Alb  3.0<L>  /  TBili  0.9  /  DBili  x   /  AST  13  /  ALT  11  /  AlkPhos  43  06-07      EKG:   IMGAGING:    ASSESSMENT:  Gen: Patient sitting in bed eating, on room air. Reports feeling "little dizzy" but otherwise with no c/o.   HEENT: normal  CV: S1S2, RRR  RR: Respirations easy, unlabored; on room air.   GI: soft, NTND, + bowel sounds  : no [place]  Neuro: patient A&O x 2, follows simple commands      HPI:  80F from home, lives alone, PMH of short-term memory loss, A.fib, HTN, HLD presented to ED c/o weakness. Pt. is AAO X2 and complaints of generalized weakness and dizziness on walking. States she is always anemic and has not noticed any active bleeding or black stool. Also states that she is not taking any medications since long time. She endorses occasional shortness of breath and states she has had since childhood.  Spoke to daughter, Nae over phone. She lives in the same building with her mother and takes care of her, she gives her medications regularly. She states pt. was not feeling good since yesterday with loss of appetite and generalized weakness, which progressively got worst today, hence she called ambulance. When EMS arrived, they were suspicious of slurring of speech but daughter mentions that she did not have any slurring of speech, she was just weak. On further questioning, she states she did notice dark stool X 1 episode today, otherwise no prior active bleeding or black stool. Denies any numbness, tingling, weakness of extremities, difficulty swallowing, urinary complaints. (06 Jun 2020 23:54)    Discussed with attending regarding f/u H/H.       PLAN:   1. Low H/H: Repeat Hgb improved from 4.6 to 7.2. Patient on Eliquis for Afib - on hold. FOBT + but no active bleed. No transfusion required at this time. F/U GI Dr. Romano.  2. Low K. Repeat K decreased from 3.6 to 3.1. Replace with KCl 40meq x 2 and KPhos BID. F/U labs tomorrow.

## 2020-06-07 NOTE — CONSULT NOTE ADULT - SUBJECTIVE AND OBJECTIVE BOX
Patient is a 80y old  Female who presents with a chief complaint of Weakness (07 Jun 2020 10:19)    80F from home, lives alone, PMH of short-term memory loss, A.fib, HTN, HLD presented to ED c/o weakness. Pt. is AAO X2 and complaints of generalized weakness and dizziness on walking. States she is always anemic and has not noticed any active bleeding or black stool. Also states that she is not taking any medications since long time. She endorses occasional shortness of breath and states she has had since childhood.  Spoke to daughter, Christina over phone. She lives in the same building with her mother and takes care of her, she gives her medications regularly. She states pt. was not feeling good since yesterday with loss of appetite and generalized weakness, which progressively got worst today, hence she called ambulance. When EMS arrived, they were suspicious of slurring of speech but daughter mentions that she did not have any slurring of speech, she was just weak. On further questioning, she states she did notice dark stool X 1 epsiode today, otherwise no prior active bleeding or black stool. Denies any numbness, tingling, weakness of extremities, difficulty swallowing, urinary complaints.    REVIEW OF SYSTEMS  Constitutional:   No fever, no fatigue, no pallor, no night sweats, no weight loss.  HEENT:   No eye pain, no vision changes, no icterus, no mouth ulcers.  Respiratory:   No shortness of breath, no cough, no respiratory distress.   Cardiovascular:   No chest pain, no palpitations.   Gastrointestinal: No abdominal pain, no nausea, no vomiting , no diarrhea no constipation, no hematochezia, ? melena.  Skin:   No rashes, no jaundice, no eczema.   Musculoskeletal:   No joint pain, no swelling, no myalgia.   Neurologic:   No headache, no seizure, no weakness.   Genitourinary:   No dysuria, no decreased urine output.  Psychiatric:  No depression, no anxiety,   Endocrine:   No thyroid disease, no diabetes.  Heme/Lymphatic:   No anemia, no blood transfusions, no lymph node enlargement, no bleeding, no bruising.  ___________________________________________________________________________________________  Allergies    No Known Allergies    Intolerances      MEDICATIONS  (STANDING):  atorvastatin 80 milliGRAM(s) Oral at bedtime  diltiazem    milliGRAM(s) Oral daily  dronedarone 400 milliGRAM(s) Oral two times a day  pantoprazole  Injectable 40 milliGRAM(s) IV Push two times a day  potassium chloride    Tablet ER 40 milliEquivalent(s) Oral every 4 hours  potassium phosphate / sodium phosphate powder 1 Packet(s) Oral two times a day    MEDICATIONS  (PRN):      PAST MEDICAL & SURGICAL HISTORY:  DM (diabetes mellitus)  HLD (hyperlipidemia)  HTN (hypertension)  Atrial fibrillation    FAMILY HISTORY:    Social History: No hsitory of : Tobacco use, IVDA, EToH  ______________________________________________________________________________________    PHYSICAL EXAM    Daily Height in cm: 167.64 (06 Jun 2020 21:13)    Daily   BMI: 27.4 (06-06 @ 21:13)  Change in Weight:  Vital Signs Last 24 Hrs  T(C): 36.9 (07 Jun 2020 07:52), Max: 36.9 (07 Jun 2020 07:52)  T(F): 98.5 (07 Jun 2020 07:52), Max: 98.5 (07 Jun 2020 07:52)  HR: 75 (07 Jun 2020 07:52) (75 - 80)  BP: 141/74 (07 Jun 2020 07:52) (136/97 - 152/73)  BP(mean): --  RR: 18 (07 Jun 2020 07:52) (15 - 20)  SpO2: 97% (07 Jun 2020 07:52) (97% - 100%)    General:  Well developed, well nourished, alert and active, no pallor, NAD.  HEENT:    Normal appearance of conjunctiva, ears, nose, lips, oropharynx, and oral mucosa, anicteric.  Neck:  No masses, no asymmetry.  Lymph Nodes:  No lymphadenopathy.   Cardiovascular:  RRR normal S1/S2, no murmur.  Respiratory:  CTA B/L, normal respiratory effort.   Abdominal:   soft, no masses or tenderness, normoactive BS, NT/ND, no HSM.  Extremities:   No clubbing or cyanosis, normal capillary refill, no edema.   Skin:   No rash, jaundice, lesions, eczema.   Musculoskeletal:  No joint swelling, erythema or tenderness.   Neuro: No focal deficits.   Other:   _______________________________________________________________________________________________  Lab Results:                          7.2    6.92  )-----------( 172      ( 07 Jun 2020 09:35 )             23.1     06-07    140  |  110<H>  |  22<H>  ----------------------------<  135<H>  3.1<L>   |  19<L>  |  1.37<H>    Ca    7.9<L>      07 Jun 2020 09:35  Phos  2.4     06-07  Mg     2.6     06-07    TPro  6.6  /  Alb  3.0<L>  /  TBili  0.9  /  DBili  x   /  AST  13  /  ALT  11  /  AlkPhos  43  06-07    LIVER FUNCTIONS - ( 07 Jun 2020 09:35 )  Alb: 3.0 g/dL / Pro: 6.6 g/dL / ALK PHOS: 43 U/L / ALT: 11 U/L DA / AST: 13 U/L / GGT: x           PT/INR - ( 07 Jun 2020 09:35 )   PT: 15.5 sec;   INR: 1.36 ratio         PTT - ( 07 Jun 2020 09:35 )  PTT:32.0 sec  Triglycerides, Serum: 69 mg/dL (06-07 @ 09:35)    CARDIAC MARKERS ( 07 Jun 2020 03:36 )  0.047 ng/mL / x     / 264 U/L / x     / 1.6 ng/mL  CARDIAC MARKERS ( 06 Jun 2020 21:30 )  0.053 ng/mL / x     / x     / x     / x          Stool Results:          RADIOLOGY RESULTS:    SURGICAL PATHOLOGY:

## 2020-06-07 NOTE — PROGRESS NOTE ADULT - ASSESSMENT
_________________________________________________________________________________________  ========>>  M E D I C A L   A T T E N D I N G    F O L L O W  U P  N O T E  <<=========  -----------------------------------------------------------------------------------------------------    - Patient seen and examined by me earlier today.   - In summary,  SAÚL RUSH is a 80y year old woman who originally presented with weakness   - Patient today overall doing ok, comfortable, feels a bit dizzy at times     ==================>> REVIEW OF SYSTEM <<=================    GEN: no fever, no chills, no pain  RESP: no SOB, no cough, no sputum  CVS: no chest pain, no palpitations, no edema  GI: no abdominal pain, no nausea  : no dysuria, no frequency, no hematuria  Neuro: no headache, dizziness as above   Derm : no itching, no rash    ==================>> PHYSICAL EXAM <<=================    GEN: A&O X 3 , NAD , comfortable  HEENT: NCAT, PERRL, MMM, hearing intact  Neck: supple , no JVD appreciated  CVS: S1S2 , regular , No M/R/G appreciated  PULM: CTA B/L,  no W/R/R appreciated  ABD.: soft. non tender, non distended,  bowel sounds present  Extrem: intact pulses , no edema   PSYCH : normal mood,  not anxious      ==================>> MEDICATIONS <<====================    MEDICATIONS  (STANDING):  atorvastatin 80 milliGRAM(s) Oral at bedtime  diltiazem    milliGRAM(s) Oral daily  dronedarone 400 milliGRAM(s) Oral two times a day  pantoprazole  Injectable 40 milliGRAM(s) IV Push two times a day  potassium chloride    Tablet ER 40 milliEquivalent(s) Oral every 4 hours  potassium phosphate / sodium phosphate powder 1 Packet(s) Oral two times a day    ___________  Active diet:  Diet, Clear Liquid  ___________________    ==================>> VITAL SIGNS <<==================    T(C): 36.9 (20 @ 07:52), Max: 36.9 (20 @ 07:52)  HR: 75 (20 @ 07:52) (75 - 80)  BP: 141/74 (20 @ 07:52) (136/97 - 152/73)  RR: 18 (20 @ 07:52) (15 - 20)  SpO2: 97% (20 @ 07:52) (97% - 100%)     POCT Blood Glucose.: 115 mg/dL (2020 21:13)     ==================>> LAB AND IMAGING <<==================                        7.2    6.92  )-----------( 172      ( 2020 09:35 )             23.1     Hemoglobin:   7.2 <<==,  4.6 <<==       140  |  110<H>  |  22<H>  ----------------------------<  135<H>  3.1<L>   |  19<L>  |  1.37<H>     Ca    7.9<L>      2020 09:35  Phos  2.4     06-  Mg     2.6     06-07    TPro  6.6  /  Alb  3.0<L>  /  TBili  0.9  /  DBili  x   /  AST  13  /  ALT  11  /  AlkPhos  43  06-07    PT/INR - ( 2020 09:35 )   PT: 15.5 sec;   INR: 1.36 ratio        CARDIAC MARKERS ( 2020 03:36 )  0.047 ng/mL / x     / 264 U/L / x     / 1.6 ng/mL  CARDIAC MARKERS ( 2020 21:30 )  0.053 ng/mL / x     / x     / x     / x           Urinalysis Basic - ( 2020 02:03 )  Color: Yellow / Appearance: Clear / S.020 / pH: x  Gluc: x / Ketone: Negative  / Bili: Negative / Urobili: Negative   Blood: x / Protein: 30 mg/dL / Nitrite: Negative   Leuk Esterase: Negative / RBC: 0-2 /HPF / WBC 0-2 /HPF   Sq Epi: x / Non Sq Epi: Few /HPF / Bacteria: Trace /HPF    TSH:      0.98   (20)           Lipid profile:  (20)     Total: 76     LDL  : 25     HDL  :37     TG   :69     ___________________________________________________________________________________  ===============>>  A S S E S S M E N T   A N D   P L A N <<===============  ------------------------------------------------------------------------------------------    · Assessment		  80F from home, lives alone, PMH of short-term memory loss, A.fib, HTN, HLD presented to ED c/o weakness. Admitted for severe symptomatic anemia likely GIB, ARF, NSTEMI.    Problem/Plan - 1:  ·  Problem: Symptomatic anemia.  Plan: p/w generalized weakness with dizziness  H/H 4.6/15.5, black stool on rectal exam on admission, FOBT positive, pt. was previously on eliquis but denies being on it recently !!   INR 1.66  likely anemia due to UGIB  on  clears   IV Protonix BID  Transfuse further P[facility] as needed to optimize for GI workup   CBC q6  GI Consult Dr Romano.     Problem/Plan - 2:  ·  Problem:  VASHTI      p/w BUN/Cr 27/1.42  daughter denies any renal issue before  likely pre-renal due to anemia  monitor post transfusion   Avoid nephrotoxic medications.    Problem/Plan - 3:  ·  Problem: elevated troponin in setting of severe anemia with EKG changes   cardio eval   tele monitor > ? DC if ok with cardio   will hold anti-platelet due to low Hb with FOBT positive  FU ECHO.     Problem/Plan - 4:  ·  Problem: h/o Atrial fibrillation.  rate controlled  EKG shows NSR with 1st degree block, QTc 483, ST depression with T inv. in V4, V5, V6 (no prior EKG to compare)  c/w multaq and diltiazem with parameters  hold eliquis and asa due to FOBT positive with low Hb.   need further eval and decision making on resuming Eliquis ( pt states has not been taking it !!     Problem/Plan - 5:  Problem: HTN   Home meds: multaq, diltiazem  c/w multaq and diltiazem with parameters  monitor BP and adjust meds.    Problem/Plan - 6:  ·  Problem: HLD   on atorvastatin 80mg at home  FU Lipid Panel in AM.     --------------------------------------------  Case discussed with pt, NP  Education given on findings and plan of care  ___________________________  H. AJ Coulter.  Pager: [phone]

## 2020-06-08 LAB
ALBUMIN SERPL ELPH-MCNC: 2.7 G/DL — LOW (ref 3.5–5)
ALP SERPL-CCNC: 41 U/L — SIGNIFICANT CHANGE UP (ref 40–120)
ALT FLD-CCNC: 13 U/L DA — SIGNIFICANT CHANGE UP (ref 10–60)
ANION GAP SERPL CALC-SCNC: 11 MMOL/L — SIGNIFICANT CHANGE UP (ref 5–17)
AST SERPL-CCNC: 20 U/L — SIGNIFICANT CHANGE UP (ref 10–40)
BASOPHILS # BLD AUTO: 0.03 K/UL — SIGNIFICANT CHANGE UP (ref 0–0.2)
BASOPHILS NFR BLD AUTO: 0.5 % — SIGNIFICANT CHANGE UP (ref 0–2)
BILIRUB SERPL-MCNC: 0.7 MG/DL — SIGNIFICANT CHANGE UP (ref 0.2–1.2)
BUN SERPL-MCNC: 24 MG/DL — HIGH (ref 7–18)
CALCIUM SERPL-MCNC: 7.8 MG/DL — LOW (ref 8.4–10.5)
CHLORIDE SERPL-SCNC: 110 MMOL/L — HIGH (ref 96–108)
CO2 SERPL-SCNC: 20 MMOL/L — LOW (ref 22–31)
CREAT SERPL-MCNC: 1.17 MG/DL — SIGNIFICANT CHANGE UP (ref 0.5–1.3)
EOSINOPHIL # BLD AUTO: 0.26 K/UL — SIGNIFICANT CHANGE UP (ref 0–0.5)
EOSINOPHIL NFR BLD AUTO: 3.9 % — SIGNIFICANT CHANGE UP (ref 0–6)
GLUCOSE SERPL-MCNC: 88 MG/DL — SIGNIFICANT CHANGE UP (ref 70–99)
HCT VFR BLD CALC: 22.4 % — LOW (ref 34.5–45)
HCT VFR BLD CALC: 24.2 % — LOW (ref 34.5–45)
HCT VFR BLD CALC: 24.5 % — LOW (ref 34.5–45)
HGB BLD-MCNC: 7.1 G/DL — LOW (ref 11.5–15.5)
HGB BLD-MCNC: 7.7 G/DL — LOW (ref 11.5–15.5)
HGB BLD-MCNC: 7.8 G/DL — LOW (ref 11.5–15.5)
IMM GRANULOCYTES NFR BLD AUTO: 0.3 % — SIGNIFICANT CHANGE UP (ref 0–1.5)
LYMPHOCYTES # BLD AUTO: 1.97 K/UL — SIGNIFICANT CHANGE UP (ref 1–3.3)
LYMPHOCYTES # BLD AUTO: 29.7 % — SIGNIFICANT CHANGE UP (ref 13–44)
MAGNESIUM SERPL-MCNC: 2.8 MG/DL — HIGH (ref 1.6–2.6)
MCHC RBC-ENTMCNC: 28.6 PG — SIGNIFICANT CHANGE UP (ref 27–34)
MCHC RBC-ENTMCNC: 28.7 PG — SIGNIFICANT CHANGE UP (ref 27–34)
MCHC RBC-ENTMCNC: 29 PG — SIGNIFICANT CHANGE UP (ref 27–34)
MCHC RBC-ENTMCNC: 31.4 GM/DL — LOW (ref 32–36)
MCHC RBC-ENTMCNC: 31.7 GM/DL — LOW (ref 32–36)
MCHC RBC-ENTMCNC: 32.2 GM/DL — SIGNIFICANT CHANGE UP (ref 32–36)
MCV RBC AUTO: 90 FL — SIGNIFICANT CHANGE UP (ref 80–100)
MCV RBC AUTO: 90.3 FL — SIGNIFICANT CHANGE UP (ref 80–100)
MCV RBC AUTO: 91.4 FL — SIGNIFICANT CHANGE UP (ref 80–100)
MONOCYTES # BLD AUTO: 0.51 K/UL — SIGNIFICANT CHANGE UP (ref 0–0.9)
MONOCYTES NFR BLD AUTO: 7.7 % — SIGNIFICANT CHANGE UP (ref 2–14)
NEUTROPHILS # BLD AUTO: 3.84 K/UL — SIGNIFICANT CHANGE UP (ref 1.8–7.4)
NEUTROPHILS NFR BLD AUTO: 57.9 % — SIGNIFICANT CHANGE UP (ref 43–77)
NRBC # BLD: 0 /100 WBCS — SIGNIFICANT CHANGE UP (ref 0–0)
PHOSPHATE SERPL-MCNC: 2.8 MG/DL — SIGNIFICANT CHANGE UP (ref 2.5–4.5)
PLATELET # BLD AUTO: 158 K/UL — SIGNIFICANT CHANGE UP (ref 150–400)
PLATELET # BLD AUTO: 175 K/UL — SIGNIFICANT CHANGE UP (ref 150–400)
PLATELET # BLD AUTO: 177 K/UL — SIGNIFICANT CHANGE UP (ref 150–400)
POTASSIUM SERPL-MCNC: 3.8 MMOL/L — SIGNIFICANT CHANGE UP (ref 3.5–5.3)
POTASSIUM SERPL-SCNC: 3.8 MMOL/L — SIGNIFICANT CHANGE UP (ref 3.5–5.3)
PROT SERPL-MCNC: 6 G/DL — SIGNIFICANT CHANGE UP (ref 6–8.3)
RBC # BLD: 2.48 M/UL — LOW (ref 3.8–5.2)
RBC # BLD: 2.68 M/UL — LOW (ref 3.8–5.2)
RBC # BLD: 2.69 M/UL — LOW (ref 3.8–5.2)
RBC # FLD: 17.4 % — HIGH (ref 10.3–14.5)
RBC # FLD: 17.7 % — HIGH (ref 10.3–14.5)
RBC # FLD: 17.9 % — HIGH (ref 10.3–14.5)
SODIUM SERPL-SCNC: 141 MMOL/L — SIGNIFICANT CHANGE UP (ref 135–145)
WBC # BLD: 6.63 K/UL — SIGNIFICANT CHANGE UP (ref 3.8–10.5)
WBC # BLD: 6.66 K/UL — SIGNIFICANT CHANGE UP (ref 3.8–10.5)
WBC # BLD: 7.27 K/UL — SIGNIFICANT CHANGE UP (ref 3.8–10.5)
WBC # FLD AUTO: 6.63 K/UL — SIGNIFICANT CHANGE UP (ref 3.8–10.5)
WBC # FLD AUTO: 6.66 K/UL — SIGNIFICANT CHANGE UP (ref 3.8–10.5)
WBC # FLD AUTO: 7.27 K/UL — SIGNIFICANT CHANGE UP (ref 3.8–10.5)

## 2020-06-08 RX ORDER — SOD SULF/SODIUM/NAHCO3/KCL/PEG
4000 SOLUTION, RECONSTITUTED, ORAL ORAL ONCE
Refills: 0 | Status: COMPLETED | OUTPATIENT
Start: 2020-06-08 | End: 2020-06-08

## 2020-06-08 RX ORDER — MULTIVIT WITH MIN/MFOLATE/K2 340-15/3 G
1 POWDER (GRAM) ORAL EVERY 6 HOURS
Refills: 0 | Status: COMPLETED | OUTPATIENT
Start: 2020-06-08 | End: 2020-06-09

## 2020-06-08 RX ORDER — SODIUM CHLORIDE 9 MG/ML
1000 INJECTION, SOLUTION INTRAVENOUS
Refills: 0 | Status: DISCONTINUED | OUTPATIENT
Start: 2020-06-08 | End: 2020-06-11

## 2020-06-08 RX ADMIN — ATORVASTATIN CALCIUM 80 MILLIGRAM(S): 80 TABLET, FILM COATED ORAL at 22:11

## 2020-06-08 RX ADMIN — ERGOCALCIFEROL 50000 UNIT(S): 1.25 CAPSULE ORAL at 11:30

## 2020-06-08 RX ADMIN — Medication 4000 MILLILITER(S): at 17:32

## 2020-06-08 RX ADMIN — Medication 180 MILLIGRAM(S): at 05:37

## 2020-06-08 RX ADMIN — DRONEDARONE 400 MILLIGRAM(S): 400 TABLET, FILM COATED ORAL at 05:37

## 2020-06-08 RX ADMIN — Medication 1 BOTTLE: at 22:56

## 2020-06-08 RX ADMIN — Medication 1 PACKET(S): at 05:37

## 2020-06-08 RX ADMIN — DRONEDARONE 400 MILLIGRAM(S): 400 TABLET, FILM COATED ORAL at 17:13

## 2020-06-08 RX ADMIN — SODIUM CHLORIDE 70 MILLILITER(S): 9 INJECTION, SOLUTION INTRAVENOUS at 22:08

## 2020-06-08 RX ADMIN — PANTOPRAZOLE SODIUM 40 MILLIGRAM(S): 20 TABLET, DELAYED RELEASE ORAL at 05:38

## 2020-06-08 RX ADMIN — PANTOPRAZOLE SODIUM 40 MILLIGRAM(S): 20 TABLET, DELAYED RELEASE ORAL at 17:13

## 2020-06-08 NOTE — PROGRESS NOTE ADULT - SUBJECTIVE AND OBJECTIVE BOX
Summary:   80y  Female      Subjective:       Objective:    MEDICATIONS  (STANDING):  atorvastatin 80 milliGRAM(s) Oral at bedtime  diltiazem    milliGRAM(s) Oral daily  dronedarone 400 milliGRAM(s) Oral two times a day  ergocalciferol 70844 Unit(s) Oral <User Schedule>  pantoprazole  Injectable 40 milliGRAM(s) IV Push two times a day    MEDICATIONS  (PRN):              Vital Signs Last 24 Hrs  T(C): 36.8 (2020 14:49), Max: 36.9 (2020 21:37)  T(F): 98.2 (2020 14:49), Max: 98.5 (2020 21:37)  HR: 79 (2020 14:49) (76 - 92)  BP: 133/58 (2020 14:49) (129/89 - 157/86)  BP(mean): --  RR: 18 (2020 14:49) (18 - 20)  SpO2: 100% (2020 14:49) (100% - 100%)      General:  Well developed, well nourished, alert and active, no pallor, NAD.  HEENT:    Normal appearance of conjunctiva, ears, nose, lips, oropharynx, and oral mucosa, anicteric.  Neck:  No masses, no asymmetry.  Lymph Nodes:  No lymphadenopathy.   Cardiovascular:  RRR normal S1/S2, no murmur.  Respiratory:  CTA B/L, normal respiratory effort.   Abdominal:   soft, no masses or tenderness, normoactive BS, NT/ND, no HSM.  Extremities:   No clubbing or cyanosis, normal capillary refill, no edema.   Skin:   No rash, jaundice, lesions, eczema.   Musculoskeletal:  No joint swelling, erythema or tenderness.   Neuro: No focal deficits.   Other:       LABS:                        7.7    7.27  )-----------( 177      ( 2020 14:45 )             24.5     06-08    141  |  110<H>  |  24<H>  ----------------------------<  88  3.8   |  20<L>  |  1.17    Ca    7.8<L>      2020 06:35  Phos  2.8     06-08  Mg     2.8     06-08    TPro  6.0  /  Alb  2.7<L>  /  TBili  0.7  /  DBili  x   /  AST  20  /  ALT  13  /  AlkPhos  41  06-08    PT/INR - ( 2020 09:35 )   PT: 15.5 sec;   INR: 1.36 ratio         PTT - ( 2020 09:35 )  PTT:32.0 sec  Urinalysis Basic - ( 2020 02:03 )    Color: Yellow / Appearance: Clear / S.020 / pH: x  Gluc: x / Ketone: Negative  / Bili: Negative / Urobili: Negative   Blood: x / Protein: 30 mg/dL / Nitrite: Negative   Leuk Esterase: Negative / RBC: 0-2 /HPF / WBC 0-2 /HPF   Sq Epi: x / Non Sq Epi: Few /HPF / Bacteria: Trace /HPF        RADIOLOGY & ADDITIONAL TESTS:

## 2020-06-08 NOTE — PROGRESS NOTE ADULT - PROBLEM SELECTOR PLAN 5
rate controlled  EKG shows NSR with 1st degree block, QTc 483, ST depression with T inv. in V4, V5, V6 (no prior EKG to compare)  c/w multaq and diltiazem with parameters  hold eliquis and asa due to FOBT positive with low Hb

## 2020-06-08 NOTE — PROGRESS NOTE ADULT - SUBJECTIVE AND OBJECTIVE BOX
Patient is a 80y old  Female who presents with a chief complaint of Weakness (08 Jun 2020 08:17)      INTERVAL HPI/OVERNIGHT EVENTS:      REVIEW OF SYSTEMS:  CONSTITUTIONAL: No fever, weight loss, or fatigue  EYES: No eye pain, visual disturbances, or discharge  ENMT:  No difficulty hearing, tinnitus, vertigo; No sinus or throat pain  NECK: No pain or stiffness  BREASTS: No pain, masses, or nipple discharge  RESPIRATORY: No cough, wheezing, chills or hemoptysis; No shortness of breath  CARDIOVASCULAR: No chest pain, palpitations, dizziness, or leg swelling  GASTROINTESTINAL: No abdominal or epigastric pain. No nausea, vomiting, or hematemesis; No diarrhea or constipation. No melena or hematochezia.  GENITOURINARY: No dysuria, frequency, hematuria, or incontinence  NEUROLOGICAL: No headaches, memory loss, loss of strength, numbness, or tremors  SKIN: No itching, burning, rashes, or lesions   LYMPH NODES: No enlarged glands  ENDOCRINE: No heat or cold intolerance; No hair loss  MUSCULOSKELETAL: No joint pain or swelling; No muscle, back, or extremity pain  HEME/LYMPH: No easy bruising, or bleeding gums  ALLERY AND IMMUNOLOGIC: No hives or eczema    FAMILY HISTORY:    Vital Signs Last 24 Hrs  T(C): 36.8 (08 Jun 2020 05:12), Max: 36.9 (07 Jun 2020 12:10)  T(F): 98.2 (08 Jun 2020 05:12), Max: 98.5 (07 Jun 2020 21:37)  HR: 78 (08 Jun 2020 05:12) (77 - 92)  BP: 150/81 (08 Jun 2020 05:12) (129/89 - 157/86)  BP(mean): --  RR: 18 (08 Jun 2020 05:12) (18 - 20)  SpO2: 100% (08 Jun 2020 05:12) (100% - 100%)      PHYSICAL EXAM:  GENERAL: NAD, well-groomed, well-developed  HEAD:  Atraumatic, Normocephalic  EYES: EOMI, PERRLA, conjunctiva and sclera clear  ENMT: No tonsillar erythema, exudates, or enlargement; Moist mucous membranes, Good dentition, No lesions  NECK: Supple, No JVD, Normal thyroid  NERVOUS SYSTEM:  Alert & Oriented X3, Good concentration; Motor Strength 5/5 B/L upper and lower extremities; DTRs 2+ intact and symmetric  CHEST/LUNG: Clear to percussion bilaterally; No rales, rhonchi, wheezing, or rubs  HEART: Regular rate and rhythm; No murmurs, rubs, or gallops  ABDOMEN: Soft, Nontender, Nondistended; Bowel sounds present  EXTREMITIES:  2+ Peripheral Pulses, No clubbing, cyanosis, or edema  LYMPH: No lymphadenopathy noted  SKIN: No rashes or lesions    Consultant(s) Notes Reviewed:  [x ] YES  [ ] NO  Care Discussed with Consultants/Other Providers [ x] YES  [ ] NO    LABS:        RADIOLOGY & ADDITIONAL TESTS:    Imaging Personally Reviewed:  [ ] YES  [ ] NO  atorvastatin 80 milliGRAM(s) Oral at bedtime  diltiazem    milliGRAM(s) Oral daily  dronedarone 400 milliGRAM(s) Oral two times a day  ergocalciferol 48268 Unit(s) Oral <User Schedule>  pantoprazole  Injectable 40 milliGRAM(s) IV Push two times a day      HEALTH ISSUES - PROBLEM Dx:  Prophylactic measure: Prophylactic measure  HLD (hyperlipidemia): HLD (hyperlipidemia)  HTN (hypertension): HTN (hypertension)  Atrial fibrillation: Atrial fibrillation  NSTEMI (non-ST elevated myocardial infarction): NSTEMI (non-ST elevated myocardial infarction)  VASHTI (acute kidney injury): VASHTI (acute kidney injury)  Fecal occult blood test positive: Fecal occult blood test positive  Symptomatic anemia: Symptomatic anemia Patient is a 80y old  Female who presents with a chief complaint of Weakness (08 Jun 2020 08:17)      INTERVAL HPI/OVERNIGHT EVENTS:    - Pt is A&O x1. pt stated she feels ok.  - pt stated she has anemia since her childhood and had blood before. She doesn't think it's from GI bleed, that's why she refused.  - Discussed with pt's daughter Nae([phone]) on the phone. they talked with her PCP Vaughn Castaneda ([phone]). Pt's daughter agreed to do EGD/colonoscopy      REVIEW OF SYSTEMS:  CONSTITUTIONAL: No fever, weight loss, or fatigue  EYES: No eye pain, visual disturbances, or discharge  ENMT:  No difficulty hearing, tinnitus, vertigo; No sinus or throat pain  NECK: No pain or stiffness  BREASTS: No pain, masses, or nipple discharge  RESPIRATORY: No cough, wheezing, chills or hemoptysis; No shortness of breath  CARDIOVASCULAR: No chest pain, palpitations, dizziness, or leg swelling  GASTROINTESTINAL: No abdominal or epigastric pain. No nausea, vomiting, or hematemesis; No diarrhea or constipation. No melena or hematochezia.  GENITOURINARY: No dysuria, frequency, hematuria, or incontinence  NEUROLOGICAL: No headaches, memory loss, loss of strength, numbness, or tremors  SKIN: No itching, burning, rashes, or lesions   LYMPH NODES: No enlarged glands  ENDOCRINE: No heat or cold intolerance; No hair loss  MUSCULOSKELETAL: No joint pain or swelling; No muscle, back, or extremity pain  HEME/LYMPH: No easy bruising, or bleeding gums  ALLERY AND IMMUNOLOGIC: No hives or eczema    FAMILY HISTORY:    Vital Signs Last 24 Hrs  T(C): 36.8 (08 Jun 2020 05:12), Max: 36.9 (07 Jun 2020 12:10)  T(F): 98.2 (08 Jun 2020 05:12), Max: 98.5 (07 Jun 2020 21:37)  HR: 78 (08 Jun 2020 05:12) (77 - 92)  BP: 150/81 (08 Jun 2020 05:12) (129/89 - 157/86)  BP(mean): --  RR: 18 (08 Jun 2020 05:12) (18 - 20)  SpO2: 100% (08 Jun 2020 05:12) (100% - 100%)      PHYSICAL EXAM:  GENERAL: NAD, well-groomed, well-developed  HEAD:  Atraumatic, Normocephalic  EYES: EOMI, PERRLA, conjunctiva and sclera clear  ENMT: No tonsillar erythema, exudates, or enlargement; Moist mucous membranes, Good dentition, No lesions  NECK: Supple, No JVD, Normal thyroid  NERVOUS SYSTEM:  Alert & Oriented X3, Good concentration; Motor Strength 5/5 B/L upper and lower extremities; DTRs 2+ intact and symmetric  CHEST/LUNG: Clear to percussion bilaterally; No rales, rhonchi, wheezing, or rubs  HEART: Regular rate and rhythm; No murmurs, rubs, or gallops  ABDOMEN: Soft, Nontender, Nondistended; Bowel sounds present  EXTREMITIES:  2+ Peripheral Pulses, No clubbing, cyanosis, or edema  LYMPH: No lymphadenopathy noted  SKIN: No rashes or lesions    Consultant(s) Notes Reviewed:  [x ] YES  [ ] NO  Care Discussed with Consultants/Other Providers [ x] YES  [ ] NO    LABS:        RADIOLOGY & ADDITIONAL TESTS:    Imaging Personally Reviewed:  [ ] YES  [ ] NO  atorvastatin 80 milliGRAM(s) Oral at bedtime  diltiazem    milliGRAM(s) Oral daily  dronedarone 400 milliGRAM(s) Oral two times a day  ergocalciferol 46351 Unit(s) Oral <User Schedule>  pantoprazole  Injectable 40 milliGRAM(s) IV Push two times a day      HEALTH ISSUES - PROBLEM Dx:  Prophylactic measure: Prophylactic measure  HLD (hyperlipidemia): HLD (hyperlipidemia)  HTN (hypertension): HTN (hypertension)  Atrial fibrillation: Atrial fibrillation  NSTEMI (non-ST elevated myocardial infarction): NSTEMI (non-ST elevated myocardial infarction)  VASHTI (acute kidney injury): VASHTI (acute kidney injury)  Fecal occult blood test positive: Fecal occult blood test positive  Symptomatic anemia: Symptomatic anemia Patient is a 80y old  Female who presents with a chief complaint of Weakness (08 Jun 2020 08:17)      INTERVAL HPI/OVERNIGHT EVENTS:    - Pt is A&O x1. pt stated she feels ok.  - pt stated she has anemia since her childhood and had blood before. She doesn't think it's from GI bleed, that's why she refused.  - Discussed with pt's daughter Nae([phone]) on the phone. they talked with her PCP Vaughn Castaneda ([phone]). Pt's daughter agreed to do EGD/colonoscopy      REVIEW OF SYSTEMS:  CONSTITUTIONAL: No fever, weight loss, (+) fatigue  EYES: No eye pain, visual disturbances, or discharge  ENMT:  No difficulty hearing, tinnitus, vertigo; No sinus or throat pain  NECK: No pain or stiffness  BREASTS: No pain, masses, or nipple discharge  RESPIRATORY: No cough, wheezing, chills or hemoptysis; No shortness of breath  CARDIOVASCULAR: No chest pain, palpitations, dizziness, or leg swelling  GASTROINTESTINAL: No abdominal or epigastric pain. No nausea, vomiting, or hematemesis; No diarrhea or constipation. No melena or hematochezia.  GENITOURINARY: No dysuria, frequency, hematuria, or incontinence  NEUROLOGICAL: No headaches, (+) memory loss, no loss of strength, numbness, or tremors  SKIN: No itching, burning, rashes, or lesions   LYMPH NODES: No enlarged glands  ENDOCRINE: No heat or cold intolerance; No hair loss  MUSCULOSKELETAL: No joint pain or swelling; No muscle, back, or extremity pain  HEME/LYMPH: No easy bruising, or bleeding gums  ALLERY AND IMMUNOLOGIC: No hives or eczema    FAMILY HISTORY:    Vital Signs Last 24 Hrs  T(C): 36.8 (08 Jun 2020 05:12), Max: 36.9 (07 Jun 2020 12:10)  T(F): 98.2 (08 Jun 2020 05:12), Max: 98.5 (07 Jun 2020 21:37)  HR: 78 (08 Jun 2020 05:12) (77 - 92)  BP: 150/81 (08 Jun 2020 05:12) (129/89 - 157/86)  BP(mean): --  RR: 18 (08 Jun 2020 05:12) (18 - 20)  SpO2: 100% (08 Jun 2020 05:12) (100% - 100%)      PHYSICAL EXAM:  GENERAL: NAD, well-groomed, well-developed  HEAD:  Atraumatic, Normocephalic  EYES: EOMI, PERRLA, conjunctiva and sclera clear  ENMT: No tonsillar erythema, exudates, or enlargement; Moist mucous membranes, Good dentition, No lesions  NECK: Supple, No JVD, Normal thyroid  NERVOUS SYSTEM:  Alert & Oriented X1, Good concentration; Motor Strength 5/5 B/L upper and lower extremities;   CHEST/LUNG: Clear to percussion bilaterally; No rales, rhonchi, wheezing, or rubs  HEART: Regular rate and rhythm; No murmurs, rubs, or gallops  ABDOMEN: Soft, Nontender, Nondistended; Bowel sounds present  EXTREMITIES:  2+ Peripheral Pulses, No clubbing, cyanosis, or edema  LYMPH: No lymphadenopathy noted  SKIN: No rashes or lesions    Consultant(s) Notes Reviewed:  [x ] YES  [ ] NO  Care Discussed with Consultants/Other Providers [ x] YES  [ ] NO    LABS:        RADIOLOGY & ADDITIONAL TESTS:    < from: CT Brain Stroke Protocol (06.06.20 @ 21:30) >  FINDINGS:    There is no acute intracranial hemorrhage, mass effect vasogenic edema or evidence for acute large vascular territory infarct. Patchy areas of low-attenuation in the bihemispheric white matter appear grossly unchanged and are nonspecific but likely the sequela of chronic microvascularchanges.    The ventricles, sulci and cisternal spaces are stable in size and configuration demonstrating age-appropriate cerebral volume loss.  There is no midline shift or abnormal extra-axial fluid collection.    The calvarium is intact.  There are no osteoblastic or lytic calvarial or skull base lesions.  The paranasal sinuses and mastoid air cells are clear.    IMPRESSION:  No acute intracranial hemorrhage or vasogenic edema. Grossly stable mild chronic microvascular changes.    I discussed the findings with Dr. Iyer at 9:42 PM on 6/6/2020 with read back verification.    < end of copied text >    < from: Transthoracic Echocardiogram (06.07.20 @ 06:54) >  CONCLUSIONS:  1. Mitral annular calcification. Mild mitral regurgitation.    2. Calcified trileaflet aortic valve with normal opening.  Trace aortic regurgitation.  3. Aortic Root: 3.2 cm.    4. Moderately dilated left atrium.  LA volume index = 48  cc/m2.  5. Normal left ventricular internal dimensions and wall  thicknesses.  6. Normal Left Ventricular Systolic Function,  (EF = 55 to  60%)  7. Normal diastolic function.  8. Normal right atrium.  9. Normal right ventricular size and systolic function  (TAPSE  2.6cm).  10. RA Pressure is 8 mm Hg.  11. RV systolic pressure is mildly increased at  44 mm Hg.  12. There is mild tricuspid regurgitation.  13. There is mild pulmonic regurgitation.  14. Normal pericardium with no pericardial effusion.    < end of copied text >      Imaging Personally Reviewed:  [ ] YES  [ ] NO  atorvastatin 80 milliGRAM(s) Oral at bedtime  diltiazem    milliGRAM(s) Oral daily  dronedarone 400 milliGRAM(s) Oral two times a day  ergocalciferol 24166 Unit(s) Oral <User Schedule>  pantoprazole  Injectable 40 milliGRAM(s) IV Push two times a day      HEALTH ISSUES - PROBLEM Dx:  Prophylactic measure: Prophylactic measure  HLD (hyperlipidemia): HLD (hyperlipidemia)  HTN (hypertension): HTN (hypertension)  Atrial fibrillation: Atrial fibrillation  NSTEMI (non-ST elevated myocardial infarction): NSTEMI (non-ST elevated myocardial infarction)  VASHTI (acute kidney injury): VASHTI (acute kidney injury)  Fecal occult blood test positive: Fecal occult blood test positive  Symptomatic anemia: Symptomatic anemia

## 2020-06-08 NOTE — PROGRESS NOTE ADULT - PROBLEM SELECTOR PLAN 7
on atorvastatin 80mg at home  FU Lipid Panel in AM on atorvastatin 80mg at home  Lipid Panel: low HDL, otherwise WNL

## 2020-06-08 NOTE — PROGRESS NOTE ADULT - PROBLEM SELECTOR PLAN 1
p/w generalized weakness with dizziness  H/H 4.6/15.5, black stool on rectal exam, FOBT positive, pt. on eliquis  INR 1.66  no recent use of steroid, NSAID, weight loss  likely anemia due to UGIB  on  clears   IV Protonix BID  will Transfuse 2 units of [facility]  CBC q6  GI Consult Dr Romano p/w generalized weakness with dizziness  H/H 4.6/15.5 on admission, black stool on rectal exam, FOBT positive, pt. on eliquis  INR 1.66  no recent use of steroid, NSAID, weight loss  likely anemia due to UGIB  on  clears   IV Protonix BID  s/p total 3 units of [facility]  CBC q8  - f/u CBC @ 10pm  - plan for EGD/ colonoscopy tmrw  - NPO after midnight, colon prep today  GI Consult Dr Romano

## 2020-06-08 NOTE — PROGRESS NOTE ADULT - PROBLEM SELECTOR PLAN 4
Pt. asymptomatic  T1 0.053  EKG shows NSR with 1st degree block, QTc 483, ST depression with T inv. in V4, V5, V6  likely due to severe anemia  tele monitor  will hold anti-platelet due to low Hb with FOBT positive  FU T2, T3  FU ECHO Pt. asymptomatic  T1 0.053  EKG shows NSR with 1st degree block, QTc 483, ST depression with T inv. in V4, V5, V6  likely due to severe anemia  tele monitor  will hold anti-platelet due to low Hb with FOBT positive  T2 trended down  FU ECHO: EF 55-60%, mildly increased RV pressure

## 2020-06-08 NOTE — PROGRESS NOTE ADULT - SUBJECTIVE AND OBJECTIVE BOX
CARDIOLOGY     PROGRESS  NOTE   ________________________________________________    CHIEF COMPLAINT:Patient is a 80y old  Female who presents with a chief complaint of Weakness (07 Jun 2020 11:44)  no complain.  	  REVIEW OF SYSTEMS:  CONSTITUTIONAL: No fever, weight loss, or fatigue  EYES: No eye pain, visual disturbances, or discharge  ENT:  No difficulty hearing, tinnitus, vertigo; No sinus or throat pain  NECK: No pain or stiffness  RESPIRATORY: No cough, wheezing, chills or hemoptysis; No Shortness of Breath  CARDIOVASCULAR: No chest pain, palpitations, passing out, dizziness, or leg swelling  GASTROINTESTINAL: No abdominal or epigastric pain. No nausea, vomiting, or hematemesis; No diarrhea or constipation. No melena or hematochezia.  GENITOURINARY: No dysuria, frequency, hematuria, or incontinence  NEUROLOGICAL: No headaches, memory loss, loss of strength, numbness, or tremors  SKIN: No itching, burning, rashes, or lesions   LYMPH Nodes: No enlarged glands  ENDOCRINE: No heat or cold intolerance; No hair loss  MUSCULOSKELETAL: No joint pain or swelling; No muscle, back, or extremity pain  PSYCHIATRIC: No depression, anxiety, mood swings, or difficulty sleeping  HEME/LYMPH: No easy bruising, or bleeding gums  ALLERGY AND IMMUNOLOGIC: No hives or eczema	    [ ] All others negative	  [ ] Unable to obtain    PHYSICAL EXAM:  T(C): 36.8 (06-08-20 @ 05:12), Max: 36.9 (06-07-20 @ 12:10)  HR: 78 (06-08-20 @ 05:12) (77 - 92)  BP: 150/81 (06-08-20 @ 05:12) (129/89 - 157/86)  RR: 18 (06-08-20 @ 05:12) (18 - 20)  SpO2: 100% (06-08-20 @ 05:12) (100% - 100%)  Wt(kg): --  I&O's Summary      Appearance: Normal	  HEENT:   Normal oral mucosa, PERRL, EOMI	  Lymphatic: No lymphadenopathy  Cardiovascular: Normal S1 S2, No JVD, + murmurs, No edema  Respiratory: Lungs clear to auscultation	  Psychiatry: A & O x 3, Mood & affect appropriate  Gastrointestinal:  Soft, Non-tender, + BS	  Skin: No rashes, No ecchymoses, No cyanosis	  Neurologic: Non-focal  Extremities: Normal range of motion, No clubbing, cyanosis or edema  Vascular: Peripheral pulses palpable 2+ bilaterally    MEDICATIONS  (STANDING):  atorvastatin 80 milliGRAM(s) Oral at bedtime  diltiazem    milliGRAM(s) Oral daily  dronedarone 400 milliGRAM(s) Oral two times a day  ergocalciferol 21271 Unit(s) Oral <User Schedule>  pantoprazole  Injectable 40 milliGRAM(s) IV Push two times a day      TELEMETRY: 	    ECG:  	  RADIOLOGY:  OTHER: 	  	  LABS:	 	    CARDIAC MARKERS:  CARDIAC MARKERS ( 07 Jun 2020 03:36 )  0.047 ng/mL / x     / 264 U/L / x     / 1.6 ng/mL  CARDIAC MARKERS ( 06 Jun 2020 21:30 )  0.053 ng/mL / x     / x     / x     / x                                    7.8    6.63  )-----------( 158      ( 08 Jun 2020 06:35 )             24.2     06-08    141  |  110<H>  |  24<H>  ----------------------------<  88  3.8   |  20<L>  |  1.17    Ca    7.8<L>      08 Jun 2020 06:35  Phos  2.8     06-08  Mg     2.8     06-08    TPro  6.0  /  Alb  2.7<L>  /  TBili  0.7  /  DBili  x   /  AST  20  /  ALT  13  /  AlkPhos  41  06-08    proBNP:   Lipid Profile: Cholesterol 76  LDL 25  HDL 37  TG 69    HgA1c:   TSH: Thyroid Stimulating Hormone, Serum: 0.98 uU/mL (06-07 @ 09:35)    PT/INR - ( 07 Jun 2020 09:35 )   PT: 15.5 sec;   INR: 1.36 ratio         PTT - ( 07 Jun 2020 09:35 )  PTT:32.0 sec      Assessment and plan  ---------------------------  80F from home, lives alone, PMH of short-term memory loss, A.fib, HTN, HLD presented to ED c/o weakness. Pt. is AAO X2 and complaints of generalized weakness and dizziness on walking. States she is always anemic and has not noticed any active bleeding or black stool. Also states that she is not taking any medications since long time. She endorses occasional shortness of breath and states she has had since childhood.  Spoke to daughter, Christina over phone. She lives in the same building with her mother and takes care of her, she gives her medications regularly. She states pt. was not feeling good since yesterday with loss of appetite and generalized weakness, which progressively got worst today, hence she called ambulance. When EMS arrived, they were suspicious of slurring of speech but daughter mentions that she did not have any slurring of speech, she was just weak. On further questioning, she states she did notice dark stool X 1 epsiode today, otherwise no prior active bleeding or black stool. Denies any numbness, tingling, weakness of extremities, difficulty swallowing, urinary complaints.  check orthostatic  observe on tele  echo  asa daily  PAF on held  Ac sec to ?ugi bleed   continue Cardizem and Multaq  fu bp closely  GI work up CARDIOLOGY     PROGRESS  NOTE   ________________________________________________    CHIEF COMPLAINT:Patient is a 80y old  Female who presents with a chief complaint of Weakness (07 Jun 2020 11:44)  no complain.  	  REVIEW OF SYSTEMS:  CONSTITUTIONAL: No fever, weight loss, or fatigue  EYES: No eye pain, visual disturbances, or discharge  ENT:  No difficulty hearing, tinnitus, vertigo; No sinus or throat pain  NECK: No pain or stiffness  RESPIRATORY: No cough, wheezing, chills or hemoptysis; No Shortness of Breath  CARDIOVASCULAR: No chest pain, palpitations, passing out, dizziness, or leg swelling  GASTROINTESTINAL: No abdominal or epigastric pain. No nausea, vomiting, or hematemesis; No diarrhea or constipation. No melena or hematochezia.  GENITOURINARY: No dysuria, frequency, hematuria, or incontinence  NEUROLOGICAL: No headaches, memory loss, loss of strength, numbness, or tremors  SKIN: No itching, burning, rashes, or lesions   LYMPH Nodes: No enlarged glands  ENDOCRINE: No heat or cold intolerance; No hair loss  MUSCULOSKELETAL: No joint pain or swelling; No muscle, back, or extremity pain  PSYCHIATRIC: No depression, anxiety, mood swings, or difficulty sleeping  HEME/LYMPH: No easy bruising, or bleeding gums  ALLERGY AND IMMUNOLOGIC: No hives or eczema	    [ ] All others negative	  [ ] Unable to obtain    PHYSICAL EXAM:  T(C): 36.8 (06-08-20 @ 05:12), Max: 36.9 (06-07-20 @ 12:10)  HR: 78 (06-08-20 @ 05:12) (77 - 92)  BP: 150/81 (06-08-20 @ 05:12) (129/89 - 157/86)  RR: 18 (06-08-20 @ 05:12) (18 - 20)  SpO2: 100% (06-08-20 @ 05:12) (100% - 100%)  Wt(kg): --  I&O's Summary      Appearance: Normal	  HEENT:   Normal oral mucosa, PERRL, EOMI	  Lymphatic: No lymphadenopathy  Cardiovascular: Normal S1 S2, No JVD, + murmurs, No edema  Respiratory: Lungs clear to auscultation	  Psychiatry: A & O x 3, Mood & affect appropriate  Gastrointestinal:  Soft, Non-tender, + BS	  Skin: No rashes, No ecchymoses, No cyanosis	  Neurologic: Non-focal  Extremities: Normal range of motion, No clubbing, cyanosis or edema  Vascular: Peripheral pulses palpable 2+ bilaterally    MEDICATIONS  (STANDING):  atorvastatin 80 milliGRAM(s) Oral at bedtime  diltiazem    milliGRAM(s) Oral daily  dronedarone 400 milliGRAM(s) Oral two times a day  ergocalciferol [id number] Unit(s) Oral <User Schedule>  pantoprazole  Injectable 40 milliGRAM(s) IV Push two times a day      TELEMETRY: 	    ECG:  	  RADIOLOGY:  OTHER: 	  	  LABS:	 	    CARDIAC MARKERS:  CARDIAC MARKERS ( 07 Jun 2020 03:36 )  0.047 ng/mL / x     / 264 U/L / x     / 1.6 ng/mL  CARDIAC MARKERS ( 06 Jun 2020 21:30 )  0.053 ng/mL / x     / x     / x     / x                                    7.8    6.63  )-----------( 158      ( 08 Jun 2020 06:35 )             24.2     06-08    141  |  110<H>  |  24<H>  ----------------------------<  88  3.8   |  20<L>  |  1.17    Ca    7.8<L>      08 Jun 2020 06:35  Phos  2.8     06-08  Mg     2.8     06-08    TPro  6.0  /  Alb  2.7<L>  /  TBili  0.7  /  DBili  x   /  AST  20  /  ALT  13  /  AlkPhos  41  06-08    proBNP:   Lipid Profile: Cholesterol 76  LDL 25  HDL 37  TG 69    HgA1c:   TSH: Thyroid Stimulating Hormone, Serum: 0.98 uU/mL (06-07 @ 09:35)    PT/INR - ( 07 Jun 2020 09:35 )   PT: 15.5 sec;   INR: 1.36 ratio         PTT - ( 07 Jun 2020 09:35 )  PTT:32.0 sec  I explained to the patient that she will need and EGD and colonoscopy to evaluate for bleeding. She adamantly refuses. She seems to hae insight however I will call daughter to discuss.   Monitor CBC   PPI daily   Advance diet today   Clears tomorrow   Possible endoscopic eval Tuesday if patient agrees   Advanced care planning was discussed with patient and family.  Advanced care planning forms were reviewed and discussed.  Risks, benefits and alternatives of gastroenterologic procedures were discussed in detail and all questions were answered.   I was physically present for the key portions of the evaluation and management (E/M) service provided.  The patient was personally seen and examined at bedside.    Thank you for your consultation and allowing  me to participate in the care of your patients. If you have further questions please contact me at [phone].     Assessment and plan  ---------------------------  80F from home, lives alone, PMH of short-term memory loss, A.fib, HTN, HLD presented to ED c/o weakness. Pt. is AAO X2 and complaints of generalized weakness and dizziness on walking. States she is always anemic and has not noticed any active bleeding or black stool. Also states that she is not taking any medications since long time. She endorses occasional shortness of breath and states she has had since childhood.  Spoke to daughter, Christina over phone. She lives in the same building with her mother and takes care of her, she gives her medications regularly. She states pt. was not feeling good since yesterday with loss of appetite and generalized weakness, which progressively got worst today, hence she called ambulance. When EMS arrived, they were suspicious of slurring of speech but daughter mentions that she did not have any slurring of speech, she was just weak. On further questioning, she states she did notice dark stool X 1 epsiode today, otherwise no prior active bleeding or black stool. Denies any numbness, tingling, weakness of extremities, difficulty swallowing, urinary complaints.  check orthostatic  observe on tele  echo  asa daily  PAF on held  Ac sec to ?ugi bleed  continue Cardizem and Multaq  fu bp closely  GI work up  no ac unless clear by GI

## 2020-06-08 NOTE — PROGRESS NOTE ADULT - PROBLEM SELECTOR PLAN 3
p/w BUN/Cr 27/1.42  daughter denies any renal issue before  likely pre-renal due to anemia  will transfuse PRBC  FU urine lytes, renal function p/w BUN/Cr 27/1.42  daughter denies any renal issue before  likely pre-renal due to anemia  s/p transfuse PRBC  - resolved

## 2020-06-08 NOTE — PROGRESS NOTE ADULT - ASSESSMENT
80F from home, lives alone, PMH of short-term memory loss, A.fib, HTN, HLD presented to ED c/o weakness. Admitted for severe symptomatic anemia likely 2/2 UGIB, ARF, NSTEMI.    ED course: Vitals: BP: 145/65 HR: 76  RR: 20 SaO2: 98% on RA  Labs significant for H/H 4.6/15.5, FOBT positive, INR 1.66, BUN/Cr 27/1.42, T1 0.053  Radiological findings- CT head shows No acute intracranial hemorrhage or vasogenic edema. Grossly stable mild chronic microvascular changes.  s/p protonix 40  GOC: FULL CODE    Off note, code stroke was called in ED as EMS was concerned for slurring of speech but as per daughter, pt. did not have slurred speech and was only weak.

## 2020-06-08 NOTE — PROGRESS NOTE ADULT - PROBLEM SELECTOR PLAN 8
IMPROVE VTE Individual Risk Assessment  RISK                                                                Points  [  ] Previous VTE                                                  3  [  ] Thrombophilia                                               2  [  ] Lower limb paralysis                                      2        (unable to hold up >15 seconds)    [  ] Current Cancer                                              2         (within 6 months)  [x  ] Immobilization > 24 hrs                                1  [  ] ICU/CCU stay > 24 hours                              1  [x  ] Age > 60                                                      1  IMPROVE VTE Score _________2, -hold DVT proph due to anemia, SCDs IMPROVE VTE Individual Risk Assessment  RISK                                                                Points  [  ] Previous VTE                                                  3  [  ] Thrombophilia                                               2  [  ] Lower limb paralysis                                      2        (unable to hold up >15 seconds)    [  ] Current Cancer                                              2         (within 6 months)  [x  ] Immobilization > 24 hrs                                1  [  ] ICU/CCU stay > 24 hours                              1  [x  ] Age > 60                                                      1  IMPROVE VTE Score _________2,   -hold DVT proph due to anemia, SCDs

## 2020-06-08 NOTE — PROGRESS NOTE ADULT - ASSESSMENT
_________________________________________________________________________________________  ========>>  M E D I C A L   A T T E N D I N G    F O L L O W  U P  N O T E  <<=========  -----------------------------------------------------------------------------------------------------    - Patient seen and examined by me earlier today.   - In summary,  SAÚL RUSH is a 80y year old woman who originally presented with weakness   - Patient today overall doing ok, comfortable, no dizziness     ==================>> REVIEW OF SYSTEM <<=================    GEN: no fever, no chills, no pain  RESP: no SOB, no cough, no sputum  CVS: no chest pain, no palpitations, no edema  GI: no abdominal pain, no nausea  : no dysuria, no frequency, no hematuria  Neuro: no headache, dizziness as above   Derm : no itching, no rash    ==================>> PHYSICAL EXAM <<=================    GEN: A&O X 3 , NAD , comfortable  HEENT: NCAT, PERRL, MMM, hearing intact  Neck: supple , no JVD appreciated  CVS: S1S2 , regular , No M/R/G appreciated  PULM: CTA B/L,  no W/R/R appreciated  ABD.: soft. non tender, non distended,  bowel sounds present  Extrem: intact pulses , no edema   PSYCH : normal mood,  not anxious       ==================>> MEDICATIONS <<====================    atorvastatin 80 milliGRAM(s) Oral at bedtime  diltiazem    milliGRAM(s) Oral daily  dronedarone 400 milliGRAM(s) Oral two times a day  ergocalciferol 28701 Unit(s) Oral <User Schedule>  magnesium citrate Oral Solution 1 Bottle Oral every 6 hours  pantoprazole  Injectable 40 milliGRAM(s) IV Push two times a day    ___________  Active diet:  Diet, Clear Liquid  Diet, NPO after Midnight:      NPO Start Date: 08-Jun-2020,   NPO Start Time: 23:59  ___________________    ==================>> VITAL SIGNS <<==================  Height (cm): 167.64  Weight (kg): 77.1  BMI (kg/m2): 27.4  Vital Signs Last 24 HrsT(C): 36.8 (06-08-20 @ 14:49)  T(F): 98.2 (06-08-20 @ 14:49), Max: 98.5 (06-07-20 @ 21:37)  HR: 79 (06-08-20 @ 14:49) (76 - 92)  BP: 133/58 (06-08-20 @ 14:49)  RR: 18 (06-08-20 @ 14:49) (18 - 20)  SpO2: 100% (06-08-20 @ 14:49) (100% - 100%)         ==================>> LAB AND IMAGING <<==================                        7.7    7.27  )-----------( 177      ( 08 Jun 2020 14:45 )             24.5        06-08    141  |  110<H>  |  24<H>  ----------------------------<  88  3.8   |  20<L>  |  1.17    Ca    7.8<L>      08 Jun 2020 06:35  Phos  2.8     06-08  Mg     2.8     06-08    TPro  6.0  /  Alb  2.7<L>  /  TBili  0.7  /  DBili  x   /  AST  20  /  ALT  13  /  AlkPhos  41  06-08    WBC count:   7.27 <<== ,  6.63 <<== ,  7.54 <<== ,  7.16 <<== ,  6.92 <<== ,  7.40 <<==   Hemoglobin:   7.7 <<==,  7.8 <<==,  8.0 <<==,  6.9 <<==,  7.2 <<==,  4.6 <<==  platelets:  177 <==, 158 <==, 165 <==, 173 <==, 172 <==, 213 <==    Creatinine:  1.17  <<==, 1.37  <<==, 1.42  <<==  Sodium:   141  <==, 140  <==, 142  <==       AST:          20 <== , 13 <== , 12 <==      ALT:        13  <== , 11  <== , 12  <==      AP:        41  <=, 43  <=, 44  <=     Bili:        0.7  <=, 0.9  <=, 0.7  <=     < from: Transthoracic Echocardiogram (06.07.20 @ 06:54) >  CONCLUSIONS:  1. Mitral annular calcification. Mild mitral regurgitation.  2. Calcified trileaflet aortic valve with normal opening.  Trace aortic regurgitation.  3. Aortic Root: 3.2 cm.  4. Moderately dilated left atrium.  LA volume index = 48 cc/m2.  5. Normal left ventricular internal dimensions and wall thicknesses.  6. Normal Left Ventricular Systolic Function,  (EF = 55 to 60%)  7. Normal diastolic function.  8. Normal right atrium.  9. Normal right ventricular size and systolic function (TAPSE  2.6cm).  10. RA Pressure is 8 mm Hg.  11. RV systolic pressure is mildly increased at  44 mm Hg.  12. There is mild tricuspid regurgitation.  13. There is mild pulmonic regurgitation.  14. Normal pericardium with no pericardial effusion.  < end of copied text >    ___________________________________________________________________________________  ===============>>  A S S E S S M E N T   A N D   P L A N <<===============  ------------------------------------------------------------------------------------------    · Assessment		  80F from home, lives alone, PMH of short-term memory loss, A.fib, HTN, HLD presented to ED c/o weakness. Admitted for severe symptomatic anemia likely GIB, ARF, NSTEMI.    Problem/Plan - 1:  ·  Problem: Symptomatic anemia: improved post transfusion   IV Protonix BID  Transfuse further PBRC as needed to optimize for GI workup   CBC q6  GI appreciated  pt and family now agreeable to GI workup > being scheduled     Problem/Plan - 2:  ·  Problem:  VASHTI  improved      p/w BUN/Cr 27/1.42  likely pre-renal due to anemia  monitor   Avoid nephrotoxic medications.    Problem/Plan - 3:  ·  Problem: elevated troponin in setting of severe anemia with EKG changes   cardio following   will hold anti-platelet due to low Hb with FOBT positive   ECHO as above     Problem/Plan - 4:  ·  Problem: h/o Atrial fibrillation.  rate controlled  cardio following and appreciated   hold eliquis and asa due to FOBT positive with low Hb.   need further eval and decision making on resuming Eliquis     Problem/Plan - 5:  Problem: HTN   Continue Current medications otherwise and monitor.   cardio following     Problem/Plan - 6:  ·  Problem: HLD   on atorvastatin 80mg at home    GI/ DVT PPX  encourage OOB / ambulate with assist    --------------------------------------------  Case discussed with pt, HS  Education given on findings and plan of care  ___________________________  H. AJ Coulter.  Pager: [phone]

## 2020-06-08 NOTE — PROGRESS NOTE ADULT - ASSESSMENT
Severe anemia   Family agrees to colon EGD   Prep for tomorrow   Clears now   4 liters GL Magnesium Citrate 300 ml X 2 if not tolerating GL)   Two tap water enemas in am   NPO post midnight   Advanced care planning was discussed with patient and family.  Advanced care planning forms were reviewed and discussed.  Risks, benefits and alternatives of gastroenterologic procedures were discussed in detail and all questions were answered.

## 2020-06-09 ENCOUNTER — RESULT REVIEW (OUTPATIENT)
Age: 80
End: 2020-06-09

## 2020-06-09 LAB
ANION GAP SERPL CALC-SCNC: 8 MMOL/L — SIGNIFICANT CHANGE UP (ref 5–17)
BASOPHILS # BLD AUTO: 0.02 K/UL — SIGNIFICANT CHANGE UP (ref 0–0.2)
BASOPHILS NFR BLD AUTO: 0.4 % — SIGNIFICANT CHANGE UP (ref 0–2)
BUN SERPL-MCNC: 11 MG/DL — SIGNIFICANT CHANGE UP (ref 7–18)
CALCIUM SERPL-MCNC: 8 MG/DL — LOW (ref 8.4–10.5)
CHLORIDE SERPL-SCNC: 110 MMOL/L — HIGH (ref 96–108)
CO2 SERPL-SCNC: 25 MMOL/L — SIGNIFICANT CHANGE UP (ref 22–31)
CREAT SERPL-MCNC: 0.96 MG/DL — SIGNIFICANT CHANGE UP (ref 0.5–1.3)
EOSINOPHIL # BLD AUTO: 0.26 K/UL — SIGNIFICANT CHANGE UP (ref 0–0.5)
EOSINOPHIL NFR BLD AUTO: 4.7 % — SIGNIFICANT CHANGE UP (ref 0–6)
GLUCOSE BLDC GLUCOMTR-MCNC: 139 MG/DL — HIGH (ref 70–99)
GLUCOSE SERPL-MCNC: 81 MG/DL — SIGNIFICANT CHANGE UP (ref 70–99)
HCT VFR BLD CALC: 27 % — LOW (ref 34.5–45)
HGB BLD-MCNC: 8.6 G/DL — LOW (ref 11.5–15.5)
IMM GRANULOCYTES NFR BLD AUTO: 0.2 % — SIGNIFICANT CHANGE UP (ref 0–1.5)
LYMPHOCYTES # BLD AUTO: 1.39 K/UL — SIGNIFICANT CHANGE UP (ref 1–3.3)
LYMPHOCYTES # BLD AUTO: 24.9 % — SIGNIFICANT CHANGE UP (ref 13–44)
MAGNESIUM SERPL-MCNC: 3.1 MG/DL — HIGH (ref 1.6–2.6)
MCHC RBC-ENTMCNC: 28.9 PG — SIGNIFICANT CHANGE UP (ref 27–34)
MCHC RBC-ENTMCNC: 31.9 GM/DL — LOW (ref 32–36)
MCV RBC AUTO: 90.6 FL — SIGNIFICANT CHANGE UP (ref 80–100)
MONOCYTES # BLD AUTO: 0.55 K/UL — SIGNIFICANT CHANGE UP (ref 0–0.9)
MONOCYTES NFR BLD AUTO: 9.9 % — SIGNIFICANT CHANGE UP (ref 2–14)
NEUTROPHILS # BLD AUTO: 3.35 K/UL — SIGNIFICANT CHANGE UP (ref 1.8–7.4)
NEUTROPHILS NFR BLD AUTO: 59.9 % — SIGNIFICANT CHANGE UP (ref 43–77)
NRBC # BLD: 0 /100 WBCS — SIGNIFICANT CHANGE UP (ref 0–0)
PHOSPHATE SERPL-MCNC: 2.9 MG/DL — SIGNIFICANT CHANGE UP (ref 2.5–4.5)
PLATELET # BLD AUTO: 172 K/UL — SIGNIFICANT CHANGE UP (ref 150–400)
POTASSIUM SERPL-MCNC: 4.3 MMOL/L — SIGNIFICANT CHANGE UP (ref 3.5–5.3)
POTASSIUM SERPL-SCNC: 4.3 MMOL/L — SIGNIFICANT CHANGE UP (ref 3.5–5.3)
RBC # BLD: 2.98 M/UL — LOW (ref 3.8–5.2)
RBC # FLD: 17.2 % — HIGH (ref 10.3–14.5)
SODIUM SERPL-SCNC: 143 MMOL/L — SIGNIFICANT CHANGE UP (ref 135–145)
WBC # BLD: 5.58 K/UL — SIGNIFICANT CHANGE UP (ref 3.8–10.5)
WBC # FLD AUTO: 5.58 K/UL — SIGNIFICANT CHANGE UP (ref 3.8–10.5)

## 2020-06-09 PROCEDURE — 88305 TISSUE EXAM BY PATHOLOGIST: CPT | Mod: 26

## 2020-06-09 PROCEDURE — 88312 SPECIAL STAINS GROUP 1: CPT | Mod: 26

## 2020-06-09 RX ORDER — ERGOCALCIFEROL 1.25 MG/1
50000 CAPSULE ORAL
Refills: 0 | Status: DISCONTINUED | OUTPATIENT
Start: 2020-06-09 | End: 2020-06-11

## 2020-06-09 RX ORDER — ATORVASTATIN CALCIUM 80 MG/1
80 TABLET, FILM COATED ORAL AT BEDTIME
Refills: 0 | Status: DISCONTINUED | OUTPATIENT
Start: 2020-06-09 | End: 2020-06-11

## 2020-06-09 RX ORDER — DILTIAZEM HCL 120 MG
180 CAPSULE, EXT RELEASE 24 HR ORAL DAILY
Refills: 0 | Status: DISCONTINUED | OUTPATIENT
Start: 2020-06-09 | End: 2020-06-11

## 2020-06-09 RX ORDER — MULTIVIT WITH MIN/MFOLATE/K2 340-15/3 G
1 POWDER (GRAM) ORAL AT BEDTIME
Refills: 0 | Status: COMPLETED | OUTPATIENT
Start: 2020-06-09 | End: 2020-06-10

## 2020-06-09 RX ORDER — DRONEDARONE 400 MG/1
400 TABLET, FILM COATED ORAL
Refills: 0 | Status: DISCONTINUED | OUTPATIENT
Start: 2020-06-09 | End: 2020-06-11

## 2020-06-09 RX ORDER — PANTOPRAZOLE SODIUM 20 MG/1
40 TABLET, DELAYED RELEASE ORAL
Refills: 0 | Status: DISCONTINUED | OUTPATIENT
Start: 2020-06-09 | End: 2020-06-11

## 2020-06-09 RX ORDER — SODIUM CHLORIDE 9 MG/ML
1000 INJECTION, SOLUTION INTRAVENOUS
Refills: 0 | Status: DISCONTINUED | OUTPATIENT
Start: 2020-06-09 | End: 2020-06-09

## 2020-06-09 RX ADMIN — ATORVASTATIN CALCIUM 80 MILLIGRAM(S): 80 TABLET, FILM COATED ORAL at 22:12

## 2020-06-09 RX ADMIN — PANTOPRAZOLE SODIUM 40 MILLIGRAM(S): 20 TABLET, DELAYED RELEASE ORAL at 17:09

## 2020-06-09 RX ADMIN — Medication 1 BOTTLE: at 22:12

## 2020-06-09 RX ADMIN — PANTOPRAZOLE SODIUM 40 MILLIGRAM(S): 20 TABLET, DELAYED RELEASE ORAL at 05:22

## 2020-06-09 RX ADMIN — Medication 180 MILLIGRAM(S): at 05:22

## 2020-06-09 RX ADMIN — DRONEDARONE 400 MILLIGRAM(S): 400 TABLET, FILM COATED ORAL at 17:09

## 2020-06-09 RX ADMIN — DRONEDARONE 400 MILLIGRAM(S): 400 TABLET, FILM COATED ORAL at 06:48

## 2020-06-09 NOTE — PROGRESS NOTE ADULT - PROBLEM SELECTOR PLAN 1
p/w generalized weakness with dizziness  H/H 4.6/15.5 on admission, black stool on rectal exam, FOBT positive, pt. on eliquis  INR 1.36, holding eliquis  no recent use of steroid, NSAID, weight loss  likely anemia due to UGIB  on  clears   IV Protonix BID  recv'd 1 unit overnight -> hgb 8.6  s/p total 4 units of [facility]  CBC q8  plan for EGD/ colonoscopy now  GI Consult Dr Romano

## 2020-06-09 NOTE — CHART NOTE - NSCHARTNOTEFT_GEN_A_CORE
Esophagogastroduodenoscopy Report  Indication: Anemia r/o GI bleed   Referring MD:   Instrument:  #  Anesthesia: MAC  Consent:  Informed consent was obtained from the patient after providing any opportunity for questions  Procedure: The gastroscope was gently passed through the incisoral orifice into the oral cavity and under direct visualization the esophagus was intubated. The endoscope was passed down the esophagus, through the stomach and into proximal jejunum. Color, texture, mucosa and anatomy of the esophagus, stomach, and duodenum were carefully examined with the scope. The patient tolerated the procedure well. After completion of the examination, the patient was transferred to the recovery room.   Preparation: NPO   Findings:   Oropharynx	Normal  Esophagus	Normal  EG-junction	3 Cm hiatus hernia. Regular z-line   Cardia	Normal.  Body	Mild erythema and edema. Random biopsies taken.  Antrum	Mild erythema and edema. Random biopsies taken.  Pylorus	Normal.  Duodenal Bulb	Normal   2nd portion	Normal. Biopsies taken   3rd portion	Normal.  Date and time:11/27/2019 9:20:48 AM  EBL:0  Impression: 1- Mild gastritis 2- No evidence of Gi bleeding   Plan: 1- Clear liquid diet 2- Magnesium Citrate 300 ml tonight 3- Magnesium Citrate 300 mg tomorrow 4-Colonoscopy on Thursday .       Attending:       Jose Ruiz M.D.   Date and Time: 11/27/2019 9:20:48 AM

## 2020-06-09 NOTE — PROGRESS NOTE ADULT - PROBLEM SELECTOR PLAN 4
Pt. asymptomatic  T1 0.053  EKG shows NSR with 1st degree block, QTc 483, ST depression with T inv. in V4, V5, V6  likely due to severe anemia  tele monitor  will hold anti-platelet due to low Hb with FOBT positive  T2 trended down  FU ECHO: EF 55-60%, mildly increased RV pressure

## 2020-06-09 NOTE — PROGRESS NOTE ADULT - PROBLEM SELECTOR PLAN 3
p/w BUN/Cr 27/1.42  daughter denies any renal issue before  likely pre-renal due to anemia  s/p transfuse PRBC  - resolved

## 2020-06-09 NOTE — PROGRESS NOTE ADULT - SUBJECTIVE AND OBJECTIVE BOX
PGY 1 Note discussed with supervising resident and primary attending    Patient is a 80y old  Female who presents with a chief complaint of Weakness (09 Jun 2020 07:58)      INTERVAL HPI/OVERNIGHT EVENTS: pt received 1 unit of prbc overnight for hgb 7.1-> 8.6      MEDICATIONS  (STANDING):  lactated ringers 1000 milliLiter(s) (70 mL/Hr) IV Continuous <Continuous>  lactated ringers. 1000 milliLiter(s) (75 mL/Hr) IV Continuous <Continuous>    MEDICATIONS  (PRN):      __________________________________________________  REVIEW OF SYSTEMS:    CONSTITUTIONAL: no complaints, continues to have dark stool s  ALL OTHER  ROS negative        Vital Signs Last 24 Hrs  T(C): 36.9 (09 Jun 2020 09:53), Max: 36.9 (09 Jun 2020 03:35)  T(F): 98.5 (09 Jun 2020 09:53), Max: 98.5 (09 Jun 2020 09:53)  HR: 76 (09 Jun 2020 10:23) (76 - 81)  BP: 135/82 (09 Jun 2020 10:23) (132/80 - 149/88)  BP(mean): 97 (09 Jun 2020 10:08) (97 - 97)  RR: 17 (09 Jun 2020 10:23) (15 - 18)  SpO2: 100% (09 Jun 2020 10:23) (97% - 100%)    ________________________________________________  PHYSICAL EXAM:  GENERAL: NAD  HEENT: Normocephalic;  conjunctivae and sclerae clear; moist mucous membranes;   NECK : supple  CHEST/LUNG: Clear to auscultation bilaterally with good air entry   HEART: S1 S2  regular; no murmurs, gallops or rubs  ABDOMEN: Soft, Nontender, Nondistended; Bowel sounds present  EXTREMITIES: no cyanosis; no edema; no calf tenderness  SKIN: warm and dry; no rash  NERVOUS SYSTEM:  Awake and alert; follows commands   _________________________________________________  LABS:                        8.6    5.58  )-----------( 172      ( 09 Jun 2020 07:42 )             27.0     06-09    143  |  110<H>  |  11  ----------------------------<  81  4.3   |  25  |  0.96    Ca    8.0<L>      09 Jun 2020 07:42  Phos  2.9     06-09  Mg     3.1     06-09    TPro  6.0  /  Alb  2.7<L>  /  TBili  0.7  /  DBili  x   /  AST  20  /  ALT  13  /  AlkPhos  41  06-08        CAPILLARY BLOOD GLUCOSE            RADIOLOGY & ADDITIONAL TESTS:    Imaging Personally Reviewed:  YES/NO    Consultant(s) Notes Reviewed:   YES/ No    Care Discussed with Consultants :     Plan of care was discussed with patient and /or primary care giver; all questions and concerns were addressed and care was aligned with patient's wishes.

## 2020-06-09 NOTE — PROGRESS NOTE ADULT - SUBJECTIVE AND OBJECTIVE BOX
CARDIOLOGY     PROGRESS  NOTE   ________________________________________________    CHIEF COMPLAINT:Patient is a 80y old  Female who presents with a chief complaint of Weakness (08 Jun 2020 18:24)  no complain.  	  REVIEW OF SYSTEMS:  CONSTITUTIONAL: No fever, weight loss, or fatigue  EYES: No eye pain, visual disturbances, or discharge  ENT:  No difficulty hearing, tinnitus, vertigo; No sinus or throat pain  NECK: No pain or stiffness  RESPIRATORY: No cough, wheezing, chills or hemoptysis; No Shortness of Breath  CARDIOVASCULAR: No chest pain, palpitations, passing out, dizziness, or leg swelling  GASTROINTESTINAL: No abdominal or epigastric pain. No nausea, vomiting, or hematemesis; No diarrhea or constipation. No melena or hematochezia.  GENITOURINARY: No dysuria, frequency, hematuria, or incontinence  NEUROLOGICAL: No headaches, memory loss, loss of strength, numbness, or tremors  SKIN: No itching, burning, rashes, or lesions   LYMPH Nodes: No enlarged glands  ENDOCRINE: No heat or cold intolerance; No hair loss  MUSCULOSKELETAL: No joint pain or swelling; No muscle, back, or extremity pain  PSYCHIATRIC: No depression, anxiety, mood swings, or difficulty sleeping  HEME/LYMPH: No easy bruising, or bleeding gums  ALLERGY AND IMMUNOLOGIC: No hives or eczema	    [ ] All others negative	  [ ] Unable to obtain    PHYSICAL EXAM:  T(C): 36.7 (06-09-20 @ 05:56), Max: 36.9 (06-09-20 @ 03:35)  HR: 81 (06-09-20 @ 05:56) (76 - 81)  BP: 145/80 (06-09-20 @ 05:56) (133/58 - 146/85)  RR: 18 (06-09-20 @ 05:56) (18 - 18)  SpO2: 100% (06-09-20 @ 05:56) (99% - 100%)  Wt(kg): --  I&O's Summary    08 Jun 2020 07:01  -  09 Jun 2020 07:00  --------------------------------------------------------  IN: 700 mL / OUT: 0 mL / NET: 700 mL        Appearance: Normal	  HEENT:   Normal oral mucosa, PERRL, EOMI	  Lymphatic: No lymphadenopathy  Cardiovascular: Normal S1 S2, No JVD, +murmurs, No edema  Respiratory: Lungs clear to auscultation	  Psychiatry: A & O x 3, Mood & affect appropriate  Gastrointestinal:  Soft, Non-tender, + BS	  Skin: No rashes, No ecchymoses, No cyanosis	  Neurologic: Non-focal  Extremities: Normal range of motion, No clubbing, cyanosis or edema  Vascular: Peripheral pulses palpable 2+ bilaterally    MEDICATIONS  (STANDING):  atorvastatin 80 milliGRAM(s) Oral at bedtime  diltiazem    milliGRAM(s) Oral daily  dronedarone 400 milliGRAM(s) Oral two times a day  ergocalciferol 22382 Unit(s) Oral <User Schedule>  lactated ringers 1000 milliLiter(s) (70 mL/Hr) IV Continuous <Continuous>  pantoprazole  Injectable 40 milliGRAM(s) IV Push two times a day      TELEMETRY: 	    ECG:  	  RADIOLOGY:  OTHER: 	  	  LABS:	 	    CARDIAC MARKERS:                                7.1    6.66  )-----------( 175      ( 08 Jun 2020 23:24 )             22.4     06-08    141  |  110<H>  |  24<H>  ----------------------------<  88  3.8   |  20<L>  |  1.17    Ca    7.8<L>      08 Jun 2020 06:35  Phos  2.8     06-08  Mg     2.8     06-08    TPro  6.0  /  Alb  2.7<L>  /  TBili  0.7  /  DBili  x   /  AST  20  /  ALT  13  /  AlkPhos  41  06-08    proBNP:   Lipid Profile: Cholesterol 76  LDL 25  HDL 37  TG 69    HgA1c:   TSH: Thyroid Stimulating Hormone, Serum: 0.98 uU/mL (06-07 @ 09:35)    PT/INR - ( 07 Jun 2020 09:35 )   PT: 15.5 sec;   INR: 1.36 ratio         PTT - ( 07 Jun 2020 09:35 )  PTT:32.0 sec  Severe anemia   Family agrees to colon EGD   Prep for tomorrow   Clears now   4 liters GL Magnesium Citrate 300 ml X 2 if not tolerating GL)   Two tap water enemas in am   NPO post midnight       < from: Xray Chest 1 View- PORTABLE-Urgent (06.06.20 @ 21:33) >  Nonspecific small nodular opacity at the right lung base, likely decreased since 7/21/2013. Follow-up may be obtained for further evaluation.        Assessment and plan  ---------------------------  80F from home, lives alone, PMH of short-term memory loss, A.fib, HTN, HLD presented to ED c/o weakness. Pt. is AAO X2 and complaints of generalized weakness and dizziness on walking. States she is always anemic and has not noticed any active bleeding or black stool. Also states that she is not taking any medications since long time. She endorses occasional shortness of breath and states she has had since childhood.  Spoke to daughter, Christina over phone. She lives in the same building with her mother and takes care of her, she gives her medications regularly. She states pt. was not feeling good since yesterday with loss of appetite and generalized weakness, which progressively got worst today, hence she called ambulance. When EMS arrived, they were suspicious of slurring of speech but daughter mentions that she did not have any slurring of speech, she was just weak. On further questioning, she states she did notice dark stool X 1 epsiode today, otherwise no prior active bleeding or black stool. Denies any numbness, tingling, weakness of extremities, difficulty swallowing, urinary complaints.  check orthostatic  observe on tele  echo  asa daily  PAF on held  Ac sec to ?ugi bleed  continue Cardizem and Multaq  fu bp closely  GI work up  no ac unless clear by GI  pt will be clear cardiac wise for gi procedure  keep hgb >8

## 2020-06-09 NOTE — PROGRESS NOTE ADULT - ASSESSMENT
_________________________________________________________________________________________  ========>>  M E D I C A L   A T T E N D I N G    F O L L O W  U P  N O T E  <<=========  -----------------------------------------------------------------------------------------------------    - Patient seen and examined by me earlier today.   - In summary,  SAÚL RUSH is a 80y year old woman who originally presented with weakness   - Patient today overall doing ok, comfortable, no dizziness       pt reportedly did not drink C-scope prep >> only had EGD today     ==================>> REVIEW OF SYSTEM <<=================    GEN: no fever, no chills, no pain  RESP: no SOB, no cough, no sputum  CVS: no chest pain, no palpitations, no edema  GI: no abdominal pain, no nausea  : no dysuria, no frequency, no hematuria  Neuro: no headache, dizziness as above   Derm : no itching, no rash    ==================>> PHYSICAL EXAM <<=================    GEN: A&O X 3 , NAD , comfortable  HEENT: NCAT, PERRL, MMM, hearing intact  Neck: supple , no JVD appreciated  CVS: S1S2 , regular , No M/R/G appreciated  PULM: CTA B/L,  no W/R/R appreciated  ABD.: soft. non tender, non distended,  bowel sounds present  Extrem: intact pulses , no edema   PSYCH : normal mood,  not anxious      ==================>> MEDICATIONS <<====================    atorvastatin 80 milliGRAM(s) Oral at bedtime  diltiazem    milliGRAM(s) Oral daily  dronedarone 400 milliGRAM(s) Oral two times a day  ergocalciferol 61457 Unit(s) Oral <User Schedule>  lactated ringers 1000 milliLiter(s) IV Continuous <Continuous>  pantoprazole  Injectable 40 milliGRAM(s) IV Push two times a day  __________  Active diet:  Diet, Clear Liquid  ___________________    ==================>> VITAL SIGNS <<==================  Height (cm): 167.64  Weight (kg): 77.1  BMI (kg/m2): 27.4  Vital Signs Last 24 HrsT(C): 36.5 (06-09-20 @ 11:45)  T(F): 97.7 (06-09-20 @ 11:45), Max: 98.5 (06-09-20 @ 09:53)  HR: 74 (06-09-20 @ 11:45) (74 - 81)  BP: 141/86 (06-09-20 @ 11:45)  RR: 18 (06-09-20 @ 11:45) (15 - 18)  SpO2: 100% (06-09-20 @ 11:45) (97% - 100%)    CAPILLARY BLOOD GLUCOSE         ==================>> LAB AND IMAGING <<==================                        8.6    5.58  )-----------( 172      ( 09 Jun 2020 07:42 )             27.0        06-09    143  |  110<H>  |  11  ----------------------------<  81  4.3   |  25  |  0.96    Ca    8.0<L>      09 Jun 2020 07:42  Phos  2.9     06-09  Mg     3.1     06-09    TPro  6.0  /  Alb  2.7<L>  /  TBili  0.7  /  DBili  x   /  AST  20  /  ALT  13  /  AlkPhos  41  06-08    WBC count:   5.58 <<== ,  6.66 <<== ,  7.27 <<== ,  6.63 <<== ,  7.54 <<== ,  7.16 <<==   Hemoglobin:   8.6 <<==,  7.1 <<==,  7.7 <<==,  7.8 <<==,  8.0 <<==,  6.9 <<==  platelets:  172 <==, 175 <==, 177 <==, 158 <==, 165 <==, 173 <==, 172 <==    Creatinine:  0.96  <<==, 1.17  <<==, 1.37  <<==, 1.42  <<==  Sodium:   143  <==, 141  <==, 140  <==, 142  <==       AST:          20 <== , 13 <== , 12 <==      ALT:        13  <== , 11  <== , 12  <==      AP:        41  <=, 43  <=, 44  <=     Bili:        0.7  <=, 0.9  <=, 0.7  <=     < from: Transthoracic Echocardiogram (06.07.20 @ 06:54) >  CONCLUSIONS:  1. Mitral annular calcification. Mild mitral regurgitation.  2. Calcified trileaflet aortic valve with normal opening.  Trace aortic regurgitation.  3. Aortic Root: 3.2 cm.  4. Moderately dilated left atrium.  LA volume index = 48 cc/m2.  5. Normal left ventricular internal dimensions and wall thicknesses.  6. Normal Left Ventricular Systolic Function,  (EF = 55 to 60%)  7. Normal diastolic function.  8. Normal right atrium.  9. Normal right ventricular size and systolic function (TAPSE  2.6cm).  10. RA Pressure is 8 mm Hg.  11. RV systolic pressure is mildly increased at  44 mm Hg.  12. There is mild tricuspid regurgitation.  13. There is mild pulmonic regurgitation.  14. Normal pericardium with no pericardial effusion.  < end of copied text >    ENDOSCOPY:  Impression: 1- Mild gastritis 2- No evidence of Gi bleeding   Plan: 1- Clear liquid diet 2- Magnesium Citrate 300 ml tonight 3- Magnesium Citrate 300 mg tomorrow 4-Colonoscopy on Thursday .   ___________________________________________________________________________________  ===============>>  A S S E S S M E N T   A N D   P L A N <<===============  ------------------------------------------------------------------------------------------    · Assessment		  80F from home, lives alone, PMH of short-term memory loss, A.fib, HTN, HLD presented to ED c/o weakness. Admitted for severe symptomatic anemia likely GIB, ARF, NSTEMI.    Problem/Plan - 1:  ·  Problem: Symptomatic anemia: improved post transfusion   Protonix   Transfuse further PBRC as needed to optimize for GI workup   CBC close monitoring   GI appreciated and discussed   colonoscopy being rescheduled     Problem/Plan - 2:  ·  Problem:  VASHTI  improved      p/w BUN/Cr 27/1.42  likely pre-renal due to anemia  monitor   Avoid nephrotoxic medications.    Problem/Plan - 3:  ·  Problem: elevated troponin in setting of severe anemia with EKG changes   cardio following   will hold anti-platelet due to low Hb with FOBT positive   ECHO as above     Problem/Plan - 4:  ·  Problem: h/o Atrial fibrillation.  rate controlled  cardio following and appreciated   hold eliquis and asa due to FOBT positive with low Hb.   need further eval and decision making on resuming Eliquis     Problem/Plan - 5:  Problem: HTN   Continue Current medications otherwise and monitor.   cardio following     Problem/Plan - 6:  ·  Problem: HLD   on atorvastatin 80mg at home    GI/ DVT PPX  encourage OOB / ambulate with assist    --------------------------------------------  Case discussed with pt, HS, GI  Education given on findings and plan of care  ___________________________  H. AJ Coulter.  Pager: [phone]

## 2020-06-09 NOTE — PROGRESS NOTE ADULT - ASSESSMENT
80F from home, lives alone, PMH of short-term memory loss, A.fib, HTN, HLD presented to ED c/o weakness. Admitted for severe symptomatic anemia likely 2/2 UGIB, ARF, NSTEMI.    ED course: Vitals: BP: 145/65 HR: 76  RR: 20 SaO2: 98% on RA  Labs significant for H/H 4.6/15.5, FOBT positive, INR 1.66, BUN/Cr 27/1.42, T1 0.053  Radiological findings- CT head shows No acute intracranial hemorrhage or vasogenic edema. Grossly stable mild chronic microvascular changes.  s/p protonix 40  GOC: FULL CODE

## 2020-06-10 LAB
ANION GAP SERPL CALC-SCNC: 4 MMOL/L — LOW (ref 5–17)
BUN SERPL-MCNC: 6 MG/DL — LOW (ref 7–18)
CALCIUM SERPL-MCNC: 8.3 MG/DL — LOW (ref 8.4–10.5)
CHLORIDE SERPL-SCNC: 109 MMOL/L — HIGH (ref 96–108)
CO2 SERPL-SCNC: 28 MMOL/L — SIGNIFICANT CHANGE UP (ref 22–31)
CREAT SERPL-MCNC: 1.06 MG/DL — SIGNIFICANT CHANGE UP (ref 0.5–1.3)
GLUCOSE BLDC GLUCOMTR-MCNC: 132 MG/DL — HIGH (ref 70–99)
GLUCOSE BLDC GLUCOMTR-MCNC: 83 MG/DL — SIGNIFICANT CHANGE UP (ref 70–99)
GLUCOSE SERPL-MCNC: 90 MG/DL — SIGNIFICANT CHANGE UP (ref 70–99)
HCT VFR BLD CALC: 26.9 % — LOW (ref 34.5–45)
HGB BLD-MCNC: 8.7 G/DL — LOW (ref 11.5–15.5)
MAGNESIUM SERPL-MCNC: 2.9 MG/DL — HIGH (ref 1.6–2.6)
MCHC RBC-ENTMCNC: 29.6 PG — SIGNIFICANT CHANGE UP (ref 27–34)
MCHC RBC-ENTMCNC: 32.3 GM/DL — SIGNIFICANT CHANGE UP (ref 32–36)
MCV RBC AUTO: 91.5 FL — SIGNIFICANT CHANGE UP (ref 80–100)
NRBC # BLD: 0 /100 WBCS — SIGNIFICANT CHANGE UP (ref 0–0)
PHOSPHATE SERPL-MCNC: 3.4 MG/DL — SIGNIFICANT CHANGE UP (ref 2.5–4.5)
PLATELET # BLD AUTO: 192 K/UL — SIGNIFICANT CHANGE UP (ref 150–400)
POTASSIUM SERPL-MCNC: 4 MMOL/L — SIGNIFICANT CHANGE UP (ref 3.5–5.3)
POTASSIUM SERPL-SCNC: 4 MMOL/L — SIGNIFICANT CHANGE UP (ref 3.5–5.3)
RBC # BLD: 2.94 M/UL — LOW (ref 3.8–5.2)
RBC # FLD: 17.4 % — HIGH (ref 10.3–14.5)
SODIUM SERPL-SCNC: 141 MMOL/L — SIGNIFICANT CHANGE UP (ref 135–145)
WBC # BLD: 5.18 K/UL — SIGNIFICANT CHANGE UP (ref 3.8–10.5)
WBC # FLD AUTO: 5.18 K/UL — SIGNIFICANT CHANGE UP (ref 3.8–10.5)

## 2020-06-10 RX ORDER — OLANZAPINE 15 MG/1
2.5 TABLET, FILM COATED ORAL AT BEDTIME
Refills: 0 | Status: DISCONTINUED | OUTPATIENT
Start: 2020-06-10 | End: 2020-06-11

## 2020-06-10 RX ADMIN — Medication 180 MILLIGRAM(S): at 05:55

## 2020-06-10 RX ADMIN — Medication 20 MILLIGRAM(S): at 21:31

## 2020-06-10 RX ADMIN — PANTOPRAZOLE SODIUM 40 MILLIGRAM(S): 20 TABLET, DELAYED RELEASE ORAL at 05:55

## 2020-06-10 RX ADMIN — Medication 1 BOTTLE: at 21:31

## 2020-06-10 RX ADMIN — DRONEDARONE 400 MILLIGRAM(S): 400 TABLET, FILM COATED ORAL at 05:55

## 2020-06-10 RX ADMIN — PANTOPRAZOLE SODIUM 40 MILLIGRAM(S): 20 TABLET, DELAYED RELEASE ORAL at 17:09

## 2020-06-10 RX ADMIN — OLANZAPINE 2.5 MILLIGRAM(S): 15 TABLET, FILM COATED ORAL at 21:31

## 2020-06-10 RX ADMIN — DRONEDARONE 400 MILLIGRAM(S): 400 TABLET, FILM COATED ORAL at 17:09

## 2020-06-10 RX ADMIN — ATORVASTATIN CALCIUM 80 MILLIGRAM(S): 80 TABLET, FILM COATED ORAL at 21:30

## 2020-06-10 NOTE — PROGRESS NOTE ADULT - SUBJECTIVE AND OBJECTIVE BOX
Summary:   80y  Female      Subjective:       Objective:    MEDICATIONS  (STANDING):  atorvastatin 80 milliGRAM(s) Oral at bedtime  diltiazem    milliGRAM(s) Oral daily  dronedarone 400 milliGRAM(s) Oral two times a day  ergocalciferol 27570 Unit(s) Oral <User Schedule>  pantoprazole  Injectable 40 milliGRAM(s) IV Push two times a day    MEDICATIONS  (PRN):              Vital Signs Last 24 Hrs  T(C): 36.8 (2020 14:49), Max: 36.9 (2020 21:37)  T(F): 98.2 (2020 14:49), Max: 98.5 (2020 21:37)  HR: 79 (2020 14:49) (76 - 92)  BP: 133/58 (2020 14:49) (129/89 - 157/86)  BP(mean): --  RR: 18 (2020 14:49) (18 - 20)  SpO2: 100% (2020 14:49) (100% - 100%)      General:  Well developed, well nourished, alert and active, no pallor, NAD.  HEENT:    Normal appearance of conjunctiva, ears, nose, lips, oropharynx, and oral mucosa, anicteric.  Neck:  No masses, no asymmetry.  Lymph Nodes:  No lymphadenopathy.   Cardiovascular:  RRR normal S1/S2, no murmur.  Respiratory:  CTA B/L, normal respiratory effort.   Abdominal:   soft, no masses or tenderness, normoactive BS, NT/ND, no HSM.  Extremities:   No clubbing or cyanosis, normal capillary refill, no edema.   Skin:   No rash, jaundice, lesions, eczema.   Musculoskeletal:  No joint swelling, erythema or tenderness.   Neuro: No focal deficits.   Other:       LABS:                        7.7    7.27  )-----------( 177      ( 2020 14:45 )             24.5     06-08    141  |  110<H>  |  24<H>  ----------------------------<  88  3.8   |  20<L>  |  1.17    Ca    7.8<L>      2020 06:35  Phos  2.8     06-08  Mg     2.8     06-08    TPro  6.0  /  Alb  2.7<L>  /  TBili  0.7  /  DBili  x   /  AST  20  /  ALT  13  /  AlkPhos  41  06-08    PT/INR - ( 2020 09:35 )   PT: 15.5 sec;   INR: 1.36 ratio         PTT - ( 2020 09:35 )  PTT:32.0 sec  Urinalysis Basic - ( 2020 02:03 )    Color: Yellow / Appearance: Clear / S.020 / pH: x  Gluc: x / Ketone: Negative  / Bili: Negative / Urobili: Negative   Blood: x / Protein: 30 mg/dL / Nitrite: Negative   Leuk Esterase: Negative / RBC: 0-2 /HPF / WBC 0-2 /HPF   Sq Epi: x / Non Sq Epi: Few /HPF / Bacteria: Trace /HPF        RADIOLOGY & ADDITIONAL TESTS:

## 2020-06-10 NOTE — PROGRESS NOTE ADULT - ASSESSMENT
_________________________________________________________________________________________  ========>>  M E D I C A L   A T T E N D I N G    F O L L O W  U P  N O T E  <<=========  -----------------------------------------------------------------------------------------------------    - Patient seen and examined by me earlier today.   - In summary,  SAÚL RUSH is a 80y year old woman who originally presented with weakness   - Patient today overall doing ok, comfortable, no dizziness      no major events reported / noted otherwise     ==================>> REVIEW OF SYSTEM <<=================    GEN: no fever, no chills, no pain  RESP: no SOB, no cough, no sputum  CVS: no chest pain, no palpitations, no edema  GI: no abdominal pain, no nausea  : no dysuria, no frequency, no hematuria  Neuro: no headache, dizziness as above   Derm : no itching, no rash    ==================>> PHYSICAL EXAM <<=================    GEN: A&O X 3 , NAD , comfortable  HEENT: NCAT, PERRL, MMM, hearing intact  Neck: supple , no JVD appreciated  CVS: S1S2 , regular , No M/R/G appreciated  PULM: CTA B/L,  no W/R/R appreciated  ABD.: soft. non tender, non distended,  bowel sounds present  Extrem: intact pulses , no edema   PSYCH : normal mood,  not anxious       ==================>> MEDICATIONS <<====================    atorvastatin 80 milliGRAM(s) Oral at bedtime  diltiazem    milliGRAM(s) Oral daily  dronedarone 400 milliGRAM(s) Oral two times a day  ergocalciferol 12350 Unit(s) Oral <User Schedule>  lactated ringers 1000 milliLiter(s) IV Continuous <Continuous>  magnesium citrate Oral Solution 1 Bottle Oral at bedtime  pantoprazole  Injectable 40 milliGRAM(s) IV Push two times a day    MEDICATIONS  (PRN):    ___________  Active diet:  Diet, Clear Liquid  Diet, NPO after Midnight:      NPO Start Date: 10-Hans-2020,   NPO Start Time: 23:59  ___________________    ==================>> VITAL SIGNS <<==================    Vital Signs Last 24 HrsT(C): 36.7 (06-10-20 @ 07:42)  T(F): 98 (06-10-20 @ 07:42), Max: 98.5 (06-09-20 @ 09:53)  HR: 80 (06-10-20 @ 07:42) (74 - 93)  BP: 138/76 (06-10-20 @ 07:42)  RR: 17 (06-10-20 @ 07:42) (15 - 18)  SpO2: 98% (06-10-20 @ 07:42) (97% - 100%)    CAPILLARY BLOOD GLUCOSE      POCT Blood Glucose.: 132 mg/dL (10 Hans 2020 08:13)  POCT Blood Glucose.: 139 mg/dL (09 Jun 2020 17:17)     ==================>> LAB AND IMAGING <<==================                        8.7    5.18  )-----------( 192      ( 10 Hans 2020 06:15 )             26.9        06-10    141  |  109<H>  |  6<L>  ----------------------------<  90  4.0   |  28  |  1.06    Ca    8.3<L>      10 Hans 2020 06:15  Phos  3.4     06-10  Mg     2.9     06-10      WBC count:   5.18 <<== ,  5.58 <<== ,  6.66 <<== ,  7.27 <<== ,  6.63 <<== ,  7.54 <<==   Hemoglobin:   8.7 <<==,  8.6 <<==,  7.1 <<==,  7.7 <<==,  7.8 <<==,  8.0 <<==  platelets:  192 <==, 172 <==, 175 <==, 177 <==, 158 <==, 165 <==, 173 <==    Creatinine:  1.06  <<==, 0.96  <<==, 1.17  <<==, 1.37  <<==, 1.42  <<==  Sodium:   141  <==, 143  <==, 141  <==, 140  <==, 142  <==       AST:          20 <== , 13 <== , 12 <==      ALT:        13  <== , 11  <== , 12  <==      AP:        41  <=, 43  <=, 44  <=     Bili:        0.7  <=, 0.9  <=, 0.7  <=     < from: Transthoracic Echocardiogram (06.07.20 @ 06:54) >  CONCLUSIONS:  1. Mitral annular calcification. Mild mitral regurgitation.  2. Calcified trileaflet aortic valve with normal opening.  Trace aortic regurgitation.  3. Aortic Root: 3.2 cm.  4. Moderately dilated left atrium.  LA volume index = 48 cc/m2.  5. Normal left ventricular internal dimensions and wall thicknesses.  6. Normal Left Ventricular Systolic Function,  (EF = 55 to 60%)  7. Normal diastolic function.  8. Normal right atrium.  9. Normal right ventricular size and systolic function (TAPSE  2.6cm).  10. RA Pressure is 8 mm Hg.  11. RV systolic pressure is mildly increased at  44 mm Hg.  12. There is mild tricuspid regurgitation.  13. There is mild pulmonic regurgitation.  14. Normal pericardium with no pericardial effusion.  < end of copied text >    ENDOSCOPY:  Impression: 1- Mild gastritis 2- No evidence of Gi bleeding   Plan: 1- Clear liquid diet 2- Magnesium Citrate 300 ml tonight 3- Magnesium Citrate 300 mg tomorrow 4-Colonoscopy on Thursday .     ___________________________________________________________________________________  ===============>>  A S S E S S M E N T   A N D   P L A N <<===============  ------------------------------------------------------------------------------------------    · Assessment		  80F from home, lives alone, PMH of short-term memory loss, A.fib, HTN, HLD presented to ED c/o weakness. Admitted for severe symptomatic anemia likely GIB, ARF, NSTEMI.    Problem/Plan - 1:  ·  Problem: Symptomatic anemia: improved post transfusion   Protonix   Transfuse further PBRC as needed to optimize for GI workup   CBC close monitoring   GI appreciated and discussed   colonoscopy being rescheduled     Problem/Plan - 2:  ·  Problem:  VASHTI  resolved   likely pre-renal due to anemia  monitor     Problem/Plan - 3:  ·  Problem: elevated troponin in setting of severe anemia with EKG changes      likely demand ischemia   cardio following   will hold anti-platelet due to low Hb with FOBT positive   ECHO as above     Problem/Plan - 4:  ·  Problem: h/o Atrial fibrillation.  rate controlled  cardio following and appreciated   hold eliquis and asa due to FOBT positive with low Hb.   need further eval and decision making on resuming Eliquis     Problem/Plan - 5:  Problem: HTN   Continue Current medications otherwise and monitor.   cardio following     Problem/Plan - 6:  ·  Problem: HLD   on atorvastatin 80mg at home    GI/ DVT PPX  encourage OOB / ambulate with assist    --------------------------------------------  Case discussed with pt, staff   Education given on findings and plan of care  ___________________________  H. AJ Coulter.  Pager: [phone]

## 2020-06-10 NOTE — PROGRESS NOTE ADULT - ASSESSMENT
Severe anemia   Colonoscopy tomorrow   Prep for tomorrow   Two tap water enemas in am   NPO post midnight   Advanced care planning was discussed with patient and family.  Advanced care planning forms were reviewed and discussed.  Risks, benefits and alternatives of gastroenterologic procedures were discussed in detail and all questions were answered.

## 2020-06-10 NOTE — PROGRESS NOTE ADULT - SUBJECTIVE AND OBJECTIVE BOX
PGY 1 Note discussed with supervising resident and primary attending    Patient is a 80y old  Female who presents with a chief complaint of Weakness (10 Hans 2020 09:04)      INTERVAL HPI/OVERNIGHT EVENTS: offers no new complaints; current symptoms resolving    MEDICATIONS  (STANDING):  atorvastatin 80 milliGRAM(s) Oral at bedtime  bisacodyl 20 milliGRAM(s) Oral at bedtime  diltiazem    milliGRAM(s) Oral daily  dronedarone 400 milliGRAM(s) Oral two times a day  ergocalciferol 44138 Unit(s) Oral <User Schedule>  lactated ringers 1000 milliLiter(s) (70 mL/Hr) IV Continuous <Continuous>  magnesium citrate Oral Solution 1 Bottle Oral at bedtime  OLANZapine 2.5 milliGRAM(s) Oral at bedtime  pantoprazole  Injectable 40 milliGRAM(s) IV Push two times a day    MEDICATIONS  (PRN):      __________________________________________________  REVIEW OF SYSTEMS:    CONSTITUTIONAL: No fever,   EYES: no acute visual disturbances  NECK: No pain or stiffness  RESPIRATORY: No cough; No shortness of breath  CARDIOVASCULAR: No chest pain, no palpitations  GASTROINTESTINAL: No pain. No nausea or vomiting; No diarrhea   NEUROLOGICAL: No headache or numbness, no tremors  MUSCULOSKELETAL: No joint pain, no muscle pain  GENITOURINARY: no dysuria, no frequency, no hesitancy  PSYCHIATRY: no depression , no anxiety  ALL OTHER  ROS negative        Vital Signs Last 24 Hrs  T(C): 36.6 (10 Hans 2020 11:12), Max: 36.7 (09 Jun 2020 19:44)  T(F): 97.8 (10 Hans 2020 11:12), Max: 98 (09 Jun 2020 19:44)  HR: 80 (10 Hans 2020 11:12) (75 - 93)  BP: 151/79 (10 Hans 2020 11:12) (122/52 - 151/79)  BP(mean): --  RR: 18 (10 Hans 2020 11:12) (17 - 18)  SpO2: 100% (10 Hans 2020 11:12) (98% - 100%)    ________________________________________________  PHYSICAL EXAM:  GENERAL: NAD  HEENT: Normocephalic;  conjunctivae and sclerae clear; moist mucous membranes;   NECK : supple  CHEST/LUNG: Clear to auscultation bilaterally with good air entry   HEART: S1 S2  regular; no murmurs, gallops or rubs  ABDOMEN: Soft, Nontender, Nondistended; Bowel sounds present  EXTREMITIES: no cyanosis; no edema; no calf tenderness  SKIN: warm and dry; no rash  NERVOUS SYSTEM:  Awake and alert;    _________________________________________________  LABS:                        8.7    5.18  )-----------( 192      ( 10 Hans 2020 06:15 )             26.9     06-10    141  |  109<H>  |  6<L>  ----------------------------<  90  4.0   |  28  |  1.06    Ca    8.3<L>      10 Hans 2020 06:15  Phos  3.4     06-10  Mg     2.9     06-10          CAPILLARY BLOOD GLUCOSE      POCT Blood Glucose.: 83 mg/dL (10 Hans 2020 11:25)  POCT Blood Glucose.: 132 mg/dL (10 Hans 2020 08:13)  POCT Blood Glucose.: 139 mg/dL (09 Jun 2020 17:17)        RADIOLOGY & ADDITIONAL TESTS:    Imaging Personally Reviewed:  YES/NO    Consultant(s) Notes Reviewed:   YES/ No    Care Discussed with Consultants :     Plan of care was discussed with patient and /or primary care giver; all questions and concerns were addressed and care was aligned with patient's wishes.

## 2020-06-10 NOTE — PROGRESS NOTE ADULT - PROBLEM SELECTOR PLAN 4
Pt. asymptomatic  EKG shows NSR with 1st degree block, QTc 483, ST depression with T inv. in V4, V5, V6  likely due to severe anemia  will hold anti-platelet due to low Hb with FOBT positive  Troponin trended down  FU ECHO: EF 55-60%, mildly increased RV pressure

## 2020-06-10 NOTE — PROGRESS NOTE ADULT - PROBLEM SELECTOR PLAN 1
p/w generalized weakness with dizziness  H/H 4.6/15.5 on admission, black stool on rectal exam, FOBT positive, pt. on eliquis  INR 1.36, holding eliquis  no recent use of steroid, NSAID, weight loss  likely anemia due to GIB  EGD showed gastritis   on  clears NPO midnight 6/10 for colonoscopy 6/11  IV Protonix BID  s/p total 4 units of PBRC  CBC q24  GI Consult Dr Romano

## 2020-06-11 ENCOUNTER — TRANSCRIPTION ENCOUNTER (OUTPATIENT)
Age: 80
End: 2020-06-11

## 2020-06-11 ENCOUNTER — RESULT REVIEW (OUTPATIENT)
Age: 80
End: 2020-06-11

## 2020-06-11 LAB
ANION GAP SERPL CALC-SCNC: 10 MMOL/L — SIGNIFICANT CHANGE UP (ref 5–17)
BUN SERPL-MCNC: 7 MG/DL — SIGNIFICANT CHANGE UP (ref 7–18)
CALCIUM SERPL-MCNC: 8.3 MG/DL — LOW (ref 8.4–10.5)
CHLORIDE SERPL-SCNC: 109 MMOL/L — HIGH (ref 96–108)
CO2 SERPL-SCNC: 23 MMOL/L — SIGNIFICANT CHANGE UP (ref 22–31)
CREAT SERPL-MCNC: 1.33 MG/DL — HIGH (ref 0.5–1.3)
GLUCOSE BLDC GLUCOMTR-MCNC: 89 MG/DL — SIGNIFICANT CHANGE UP (ref 70–99)
GLUCOSE BLDC GLUCOMTR-MCNC: 92 MG/DL — SIGNIFICANT CHANGE UP (ref 70–99)
GLUCOSE SERPL-MCNC: 86 MG/DL — SIGNIFICANT CHANGE UP (ref 70–99)
HCT VFR BLD CALC: 27.7 % — LOW (ref 34.5–45)
HGB BLD-MCNC: 8.6 G/DL — LOW (ref 11.5–15.5)
MAGNESIUM SERPL-MCNC: 3.3 MG/DL — HIGH (ref 1.6–2.6)
MCHC RBC-ENTMCNC: 28.9 PG — SIGNIFICANT CHANGE UP (ref 27–34)
MCHC RBC-ENTMCNC: 31 GM/DL — LOW (ref 32–36)
MCV RBC AUTO: 93 FL — SIGNIFICANT CHANGE UP (ref 80–100)
NRBC # BLD: 0 /100 WBCS — SIGNIFICANT CHANGE UP (ref 0–0)
PHOSPHATE SERPL-MCNC: 3.8 MG/DL — SIGNIFICANT CHANGE UP (ref 2.5–4.5)
PLATELET # BLD AUTO: 206 K/UL — SIGNIFICANT CHANGE UP (ref 150–400)
POTASSIUM SERPL-MCNC: 4.4 MMOL/L — SIGNIFICANT CHANGE UP (ref 3.5–5.3)
POTASSIUM SERPL-SCNC: 4.4 MMOL/L — SIGNIFICANT CHANGE UP (ref 3.5–5.3)
RBC # BLD: 2.98 M/UL — LOW (ref 3.8–5.2)
RBC # FLD: 17.4 % — HIGH (ref 10.3–14.5)
SARS-COV-2 RNA SPEC QL NAA+PROBE: SIGNIFICANT CHANGE UP
SODIUM SERPL-SCNC: 142 MMOL/L — SIGNIFICANT CHANGE UP (ref 135–145)
WBC # BLD: 5.8 K/UL — SIGNIFICANT CHANGE UP (ref 3.8–10.5)
WBC # FLD AUTO: 5.8 K/UL — SIGNIFICANT CHANGE UP (ref 3.8–10.5)

## 2020-06-11 PROCEDURE — 88305 TISSUE EXAM BY PATHOLOGIST: CPT | Mod: 26

## 2020-06-11 RX ORDER — ERGOCALCIFEROL 1.25 MG/1
50000 CAPSULE ORAL
Refills: 0 | Status: DISCONTINUED | OUTPATIENT
Start: 2020-06-11 | End: 2020-06-12

## 2020-06-11 RX ORDER — SODIUM CHLORIDE 9 MG/ML
1000 INJECTION INTRAMUSCULAR; INTRAVENOUS; SUBCUTANEOUS
Refills: 0 | Status: DISCONTINUED | OUTPATIENT
Start: 2020-06-11 | End: 2020-06-11

## 2020-06-11 RX ORDER — DILTIAZEM HCL 120 MG
180 CAPSULE, EXT RELEASE 24 HR ORAL DAILY
Refills: 0 | Status: DISCONTINUED | OUTPATIENT
Start: 2020-06-11 | End: 2020-06-12

## 2020-06-11 RX ORDER — PANTOPRAZOLE SODIUM 20 MG/1
40 TABLET, DELAYED RELEASE ORAL
Refills: 0 | Status: DISCONTINUED | OUTPATIENT
Start: 2020-06-12 | End: 2020-06-12

## 2020-06-11 RX ORDER — OLANZAPINE 15 MG/1
2.5 TABLET, FILM COATED ORAL AT BEDTIME
Refills: 0 | Status: DISCONTINUED | OUTPATIENT
Start: 2020-06-11 | End: 2020-06-12

## 2020-06-11 RX ORDER — HYDROMORPHONE HYDROCHLORIDE 2 MG/ML
0.5 INJECTION INTRAMUSCULAR; INTRAVENOUS; SUBCUTANEOUS
Refills: 0 | Status: DISCONTINUED | OUTPATIENT
Start: 2020-06-11 | End: 2020-06-11

## 2020-06-11 RX ORDER — HYDROMORPHONE HYDROCHLORIDE 2 MG/ML
1 INJECTION INTRAMUSCULAR; INTRAVENOUS; SUBCUTANEOUS
Refills: 0 | Status: DISCONTINUED | OUTPATIENT
Start: 2020-06-11 | End: 2020-06-11

## 2020-06-11 RX ORDER — SODIUM CHLORIDE 9 MG/ML
1000 INJECTION, SOLUTION INTRAVENOUS
Refills: 0 | Status: DISCONTINUED | OUTPATIENT
Start: 2020-06-11 | End: 2020-06-11

## 2020-06-11 RX ORDER — ATORVASTATIN CALCIUM 80 MG/1
80 TABLET, FILM COATED ORAL AT BEDTIME
Refills: 0 | Status: DISCONTINUED | OUTPATIENT
Start: 2020-06-11 | End: 2020-06-12

## 2020-06-11 RX ORDER — DRONEDARONE 400 MG/1
400 TABLET, FILM COATED ORAL
Refills: 0 | Status: DISCONTINUED | OUTPATIENT
Start: 2020-06-11 | End: 2020-06-12

## 2020-06-11 RX ORDER — ONDANSETRON 8 MG/1
4 TABLET, FILM COATED ORAL ONCE
Refills: 0 | Status: DISCONTINUED | OUTPATIENT
Start: 2020-06-11 | End: 2020-06-11

## 2020-06-11 RX ORDER — PANTOPRAZOLE SODIUM 20 MG/1
40 TABLET, DELAYED RELEASE ORAL
Refills: 0 | Status: DISCONTINUED | OUTPATIENT
Start: 2020-06-11 | End: 2020-06-11

## 2020-06-11 RX ADMIN — PANTOPRAZOLE SODIUM 40 MILLIGRAM(S): 20 TABLET, DELAYED RELEASE ORAL at 05:32

## 2020-06-11 RX ADMIN — DRONEDARONE 400 MILLIGRAM(S): 400 TABLET, FILM COATED ORAL at 05:32

## 2020-06-11 RX ADMIN — ATORVASTATIN CALCIUM 80 MILLIGRAM(S): 80 TABLET, FILM COATED ORAL at 22:38

## 2020-06-11 RX ADMIN — OLANZAPINE 2.5 MILLIGRAM(S): 15 TABLET, FILM COATED ORAL at 23:13

## 2020-06-11 RX ADMIN — Medication 180 MILLIGRAM(S): at 05:32

## 2020-06-11 RX ADMIN — DRONEDARONE 400 MILLIGRAM(S): 400 TABLET, FILM COATED ORAL at 17:14

## 2020-06-11 NOTE — CHART NOTE - NSCHARTNOTEFT_GEN_A_CORE
Colonoscopy Report  Indication: GI bleed    Referring:   Instrument:    Anesthesia: MAC  Consent:  Informed consent was obtained from the patient after providing any opportunity for questions  Procedure: After placing the patient in the left lateral decubitus position, the colonoscope was gently inserted into the rectum and advanced to the cecum. Color, texture, mucosa, and anatomy of the colon were carefully examined with the scope. The patient tolerated the procedure well. After completion of the exam, the patient was transferred to the recovery room.     Preparation:  Findings:   Anal Canal	Normal  Rectum	Normal  Sigmoid Colon 	1 cm flat polyp. Mild oozing of blood appreciated. The polyp base was injected with ORISE with excellent lifting of the polyp. The polyp was completely removed with the hot snare. Retrieved with suction. Two hemoclips placed closing the polyp defect.   Descending Colon	Normal  Splenic Flexure	Normal  Transverse Colon	Normal  Hepatic Flexure	3 mm polyp completely removed with the cold biopsy forceps   Ascending Colon	 Normal  Cecum	Normal  Ileo-cecal Valve	Normal  Ileum 	Normal   Date and time: 11/27/2019 9:21 AM  EBL:0    Impression:  1- Colon polyps s.p EMR     Plan: Advance diet 2- Restart AC tomorrow morning 3- Follow up pathology 4- Repeat Colonoscopy in 3 years 5- Discharge as per primary team             Attending:       Jose Ruiz M.D.   Date and Time: 11/27/2019 9:21:57 AM

## 2020-06-11 NOTE — DISCHARGE NOTE PROVIDER - CARE PROVIDER_API CALL
Jose Ruiz  GASTROENTEROLOGY  [address] [place] [address]  Phone: [phone]  Fax: [phone]  Follow Up Time:

## 2020-06-11 NOTE — DISCHARGE NOTE PROVIDER - NSDCMRMEDTOKEN_GEN_ALL_CORE_FT
aspirin 81 mg oral tablet, chewable: 1 tab(s) orally once a day  atorvastatin 80 mg oral tablet: 1 tab(s) orally once a day  dilTIAZem 360 mg/24 hours oral capsule, extended release: 1 cap(s) orally once a day  Eliquis 5 mg oral tablet: 1 tab(s) orally 2 times a day  Multaq 400 mg oral tablet: 1 tab(s) orally 2 times a day aspirin 81 mg oral tablet, chewable: 1 tab(s) orally once a day  atorvastatin 80 mg oral tablet: 1 tab(s) orally once a day  Cardizem  mg/24 hours oral capsule, extended release: 1 cap(s) orally once a day   Eliquis 2.5 mg oral tablet: 1 tab(s) orally 2 times a day   ergocalciferol 50,000 intl units (1.25 mg) oral capsule: 1 cap(s) orally once a week  ferrous sulfate 325 mg (65 mg elemental iron) oral tablet: 1 tab(s) orally once a day   Multaq 400 mg oral tablet: 1 tab(s) orally 2 times a day  pantoprazole 40 mg oral delayed release tablet: 1 tab(s) orally once a day (before a meal)

## 2020-06-11 NOTE — DISCHARGE NOTE PROVIDER - NSDCCPCAREPLAN_GEN_ALL_CORE_FT
PRINCIPAL DISCHARGE DIAGNOSIS  Diagnosis: Anemia  Assessment and Plan of Treatment: Patient is admitted for Symptomatic anemia: p/w generalized weakness with dizziness. hemoglobin was  4.6 on admission, black stool on rectal exam, stool positive for blood. Patient is on eliquis at home for atrial fibrillation  INR 1.36 on admission, decision made to hold eliquis until further investigation. Patient was started on protonix and received total 4 units of blood for anemia. Patient was seen by gasteroenterologist who recommended EGD which showed gastritis. Patient had colonoscopy 6/11 which showed an oozing polyp which was resected and sent for biopsy. Patients hemoglobin remained stable during admission and patient was started on low fiber diet, Patient will need to follow up with GI after discharge to follow up on biopsy results.      SECONDARY DISCHARGE DIAGNOSES  Diagnosis: HLD (hyperlipidemia)  Assessment and Plan of Treatment: Continue with cholesterol medications. Maintain a healthy diet that consist of low sugar, low fat, low sodium diet. Exercise frequently if possible.  Follow up with primary care physician in one week after discharge.  Diet suggested: DASH Diet that Emphasizes vegetables, fruits, and fat-free or low-fat dairy products. Includes whole grains, fish, poultry, beans, seeds, nuts, and vegetable oils. Limits sodium, sweets, sugary beverages, and red meats.      Diagnosis: Atrial fibrillation  Assessment and Plan of Treatment: Atrial fibrillation: rate controlled during this admission and patient was continued on multaq and diltiazem. Eliquis was resumed before discharge after hemoglobin was noted to be stable    Diagnosis: NSTEMI (non-ST elevated myocardial infarction)  Assessment and Plan of Treatment: NSTEMI (non-ST elevated myocardial infarction): Patient asymptomatic. EKG shows NSR with 1st degree block, QTc 483, ST depression with T wave inversion in V4, V5, V6, likely due to severe anemia  Troponin were elevated but trended down. Echocardiogram showed: EF 55-60%, mildly increased RV pressure.    Diagnosis: VASHTI (acute kidney injury)  Assessment and Plan of Treatment: You were found to have acute kidney injury on admission. This was likely due tolow hemoglobin leading to decreased blood flow to your kidneys. Your BUN/Cr have been monitored throughout your stay and you have been well hydrated with nutrition and fluids. Your kidneys have resolved and your Creatinine has returned to your baseline       Diagnosis: HTN (hypertension)  Assessment and Plan of Treatment: Continue with blood pressure medication. Maintain a healthy diet that consist of low sugar, low fat, low sodium diet. Exercise frequently if possible.  Follow up with primary care physician in one week after discharge. PRINCIPAL DISCHARGE DIAGNOSIS  Diagnosis: Anemia  Assessment and Plan of Treatment: Patient is admitted for Symptomatic anemia: p/w generalized weakness with dizziness. hemoglobin was  4.6 on admission, black stool on rectal exam, stool positive for blood. Patient is on eliquis at home for atrial fibrillation  INR 1.36 on admission, decision made to hold eliquis until further investigation. Patient was started on protonix and received total 4 units of blood for anemia. Patient was seen by gasteroenterologist who recommended EGD which showed gastritis. Patient had colonoscopy 6/11 which showed an oozing polyp which was resected and sent for biopsy. Patients hemoglobin remained stable during admission and patient was started on low fiber diet, Patient will need to follow up with GI after discharge to follow up on biopsy results.      SECONDARY DISCHARGE DIAGNOSES  Diagnosis: HLD (hyperlipidemia)  Assessment and Plan of Treatment: Continue with cholesterol medications. Maintain a healthy diet that consist of low sugar, low fat, low sodium diet. Exercise frequently if possible.  Follow up with primary care physician in one week after discharge.  Diet suggested: DASH Diet that Emphasizes vegetables, fruits, and fat-free or low-fat dairy products. Includes whole grains, fish, poultry, beans, seeds, nuts, and vegetable oils. Limits sodium, sweets, sugary beverages, and red meats.      Diagnosis: Atrial fibrillation  Assessment and Plan of Treatment: Atrial fibrillation: rate controlled during this admission and patient was continued on multaq and diltiazem. Eliquis was resumed to a lower dose based on age and weight. patient shoud take 2.5 twice a day of eliquis. Cardizem dosage was decreased to 180mg as Blood pressure and heart rate were on the low side. patient needs to follow up with PCP after discharge    Diagnosis: NSTEMI (non-ST elevated myocardial infarction)  Assessment and Plan of Treatment: NSTEMI (non-ST elevated myocardial infarction): Patient asymptomatic. EKG shows NSR with 1st degree block, QTc 483, ST depression with T wave inversion in V4, V5, V6, likely due to severe anemia  Troponin were elevated but trended down. Echocardiogram showed: EF 55-60%, mildly increased RV pressure.    Diagnosis: VASHTI (acute kidney injury)  Assessment and Plan of Treatment: You were found to have acute kidney injury on admission. This was likely due tolow hemoglobin leading to decreased blood flow to your kidneys. Your BUN/Cr have been monitored throughout your stay and you have been well hydrated with nutrition and fluids. Your kidneys have resolved and your Creatinine has returned to your baseline       Diagnosis: HTN (hypertension)  Assessment and Plan of Treatment: Continue with blood pressure medication. Maintain a healthy diet that consist of low sugar, low fat, low sodium diet. Exercise frequently if possible.  Follow up with primary care physician in one week after discharge. PRINCIPAL DISCHARGE DIAGNOSIS  Diagnosis: Anemia  Assessment and Plan of Treatment: Patient is admitted for Symptomatic anemia: p/w generalized weakness with dizziness. hemoglobin was  4.6 on admission, black stool on rectal exam, stool positive for blood. Patient is on eliquis at home for atrial fibrillation  INR 1.36 on admission, decision made to hold eliquis until further investigation. Patient was started on protonix and received total 4 units of blood for anemia. Patient was seen by gasteroenterologist who recommended EGD which showed gastritis. Patient had colonoscopy 6/11 which showed an oozing polyp which was resected and sent for biopsy. Patients hemoglobin remained stable during admission and patient was started on low fiber diet, Patient will need to follow up with GI after discharge to follow up on biopsy results.      SECONDARY DISCHARGE DIAGNOSES  Diagnosis: VASHTI (acute kidney injury)  Assessment and Plan of Treatment: You were found to have acute kidney injury on admission. This was likely due tolow hemoglobin leading to decreased blood flow to your kidneys. Your BUN/Cr have been monitored throughout your stay and you have been well hydrated with nutrition and fluids. Your kidneys have resolved and your Creatinine has returned to your baseline       Diagnosis: NSTEMI (non-ST elevated myocardial infarction)  Assessment and Plan of Treatment: NSTEMI (non-ST elevated myocardial infarction): Patient asymptomatic. EKG shows NSR with 1st degree block, QTc 483, ST depression with T wave inversion in V4, V5, V6, likely due to severe anemia  Troponin were elevated but trended down. Echocardiogram showed: EF 55-60%, mildly increased RV pressure.    Diagnosis: Atrial fibrillation  Assessment and Plan of Treatment: Atrial fibrillation: rate controlled during this admission and patient was continued on multaq and diltiazem. Eliquis was resumed to a lower dose based on age and weight. patient shoud take 2.5 twice a day of eliquis. Cardizem dosage was decreased to 180mg as Blood pressure and heart rate were on the low side. patient needs to follow up with PCP and cardiologist 1 week after discharge    Diagnosis: HLD (hyperlipidemia)  Assessment and Plan of Treatment: Continue with cholesterol medications. Maintain a healthy diet that consist of low sugar, low fat, low sodium diet. Exercise frequently if possible.  Follow up with primary care physician in one week after discharge.  Diet suggested: DASH Diet that Emphasizes vegetables, fruits, and fat-free or low-fat dairy products. Includes whole grains, fish, poultry, beans, seeds, nuts, and vegetable oils. Limits sodium, sweets, sugary beverages, and red meats.      Diagnosis: HTN (hypertension)  Assessment and Plan of Treatment: Continue with blood pressure medication. Maintain a healthy diet that consist of low sugar, low fat, low sodium diet. Exercise frequently if possible.  Follow up with primary care physician in one week after discharge.

## 2020-06-11 NOTE — PROGRESS NOTE ADULT - SUBJECTIVE AND OBJECTIVE BOX
PGY 1 Note discussed with supervising resident and primary attending    Patient is a 80y old  Female who presents with a chief complaint of Weakness (11 Jun 2020 10:59)      INTERVAL HPI/OVERNIGHT EVENTS: offers no new complaints; current symptoms resolving    MEDICATIONS  (STANDING):  lactated ringers 1000 milliLiter(s) (70 mL/Hr) IV Continuous <Continuous>  lactated ringers. 1000 milliLiter(s) (75 mL/Hr) IV Continuous <Continuous>    MEDICATIONS  (PRN):  HYDROmorphone  Injectable 0.5 milliGRAM(s) IV Push every 10 minutes PRN Moderate Pain (4 - 6)  HYDROmorphone  Injectable 1 milliGRAM(s) IV Push every 10 minutes PRN Severe Pain (7 - 10)  ondansetron Injectable 4 milliGRAM(s) IV Push once PRN Nausea and/or Vomiting      __________________________________________________  REVIEW OF SYSTEMS:    CONSTITUTIONAL: No fever,   EYES: no acute visual disturbances  NECK: No pain or stiffness  RESPIRATORY: No cough; No shortness of breath  CARDIOVASCULAR: No chest pain, no palpitations  GASTROINTESTINAL: No pain. No nausea or vomiting; No diarrhea   NEUROLOGICAL: No headache or numbness, no tremors  MUSCULOSKELETAL: No joint pain, no muscle pain  GENITOURINARY: no dysuria, no frequency, no hesitancy  PSYCHIATRY: no depression , no anxiety  ALL OTHER  ROS negative        Vital Signs Last 24 Hrs  T(C): 36.6 (11 Jun 2020 10:58), Max: 37 (11 Jun 2020 07:14)  T(F): 97.8 (11 Jun 2020 10:58), Max: 98.6 (11 Jun 2020 07:14)  HR: 73 (11 Jun 2020 11:13) (73 - 86)  BP: 106/53 (11 Jun 2020 11:13) (103/90 - 147/72)  BP(mean): 69 (11 Jun 2020 11:13) (69 - 97)  RR: 16 (11 Jun 2020 11:13) (15 - 20)  SpO2: 99% (11 Jun 2020 11:13) (99% - 100%)    ________________________________________________  PHYSICAL EXAM:  GENERAL: NAD  HEENT: Normocephalic;  conjunctivae and sclerae clear; moist mucous membranes;   NECK : supple  CHEST/LUNG: Clear to auscultation bilaterally with good air entry   HEART: S1 S2  regular; no murmurs, gallops or rubs  ABDOMEN: Soft, Nontender, Nondistended; Bowel sounds present  EXTREMITIES: no cyanosis; no edema; no calf tenderness  SKIN: warm and dry; no rash  NERVOUS SYSTEM:  Awake and alert; Oriented  to person   _________________________________________________  LABS:                        8.6    5.80  )-----------( 206      ( 11 Jun 2020 06:38 )             27.7     06-11    142  |  109<H>  |  7   ----------------------------<  86  4.4   |  23  |  1.33<H>    Ca    8.3<L>      11 Jun 2020 06:38  Phos  3.8     06-11  Mg     3.3     06-11          CAPILLARY BLOOD GLUCOSE      POCT Blood Glucose.: 92 mg/dL (11 Jun 2020 10:03)  POCT Blood Glucose.: 83 mg/dL (10 Hans 2020 11:25)        RADIOLOGY & ADDITIONAL TESTS:    Imaging Personally Reviewed:  YES/NO    Consultant(s) Notes Reviewed:   YES/ No    Care Discussed with Consultants :     Plan of care was discussed with patient and /or primary care giver; all questions and concerns were addressed and care was aligned with patient's wishes.

## 2020-06-11 NOTE — DIETITIAN INITIAL EVALUATION ADULT. - PERTINENT MEDS FT
MEDICATIONS  (STANDING):  atorvastatin 80 milliGRAM(s) Oral at bedtime  diltiazem    milliGRAM(s) Oral daily  dronedarone 400 milliGRAM(s) Oral two times a day  ergocalciferol 45785 Unit(s) Oral <User Schedule>  lactated ringers 1000 milliLiter(s) (70 mL/Hr) IV Continuous <Continuous>  OLANZapine 2.5 milliGRAM(s) Oral at bedtime  pantoprazole  Injectable 40 milliGRAM(s) IV Push two times a day

## 2020-06-11 NOTE — DISCHARGE NOTE PROVIDER - HOSPITAL COURSE
80F from home, lives alone, PMH of short-term memory loss, A.fib, HTN, HLD presented to ED c/o weakness. Pt. is AAO X2 and complaints of generalized weakness and dizziness on walking. States she is always anemic and has not noticed any active bleeding or black stool. Also states that she is not taking any medications since long time. She endorses occasional shortness of breath and states she has had since childhood.    Spoke to daughter, Christina over phone. She lives in the same building with her mother and takes care of her, she gives her medications regularly. She states pt. was not feeling good since yesterday with loss of appetite and generalized weakness, which progressively got worst today, hence she called ambulance. When EMS arrived, they were suspicious of slurring of speech but daughter mentions that she did not have any slurring of speech, she was just weak. On further questioning, she states she did notice dark stool X 1 epsiode today, otherwise no prior active bleeding or black stool. Denies any numbness, tingling, weakness of extremities, difficulty swallowing, urinary complaints. (06 Jun 2020 23:54) 80F from home, lives alone, PMH of short-term memory loss, A.fib, HTN, HLD presented to ED c/o weakness. Pt. is AAO X2 and complaints of generalized weakness and dizziness on walking. States she is always anemic and has not noticed any active bleeding or black stool. Also states that she is not taking any medications since long time. She endorses occasional shortness of breath and states she has had since childhood.    Spoke to daughter, Christina over phone. She lives in the same building with her mother and takes care of her, she gives her medications regularly. She states pt. was not feeling good since yesterday with loss of appetite and generalized weakness, which progressively got worst today, hence she called ambulance. When EMS arrived, they were suspicious of slurring of speech but daughter mentions that she did not have any slurring of speech, she was just weak. On further questioning, she states she did notice dark stool X 1 epsiode today, otherwise no prior active bleeding or black stool. Denies any numbness, tingling, weakness of extremities, difficulty swallowing, urinary complaints. (06 Jun 2020 23:54)        Patient is admitted for Symptomatic anemia: p/w generalized weakness with dizziness. hemoglobin was  4.6 on admission, black stool on rectal exam, stool positive for blood. Patient is on eliquis at home for atrial fibrillation    INR 1.36 on admission, decision made to hold eliquis until further investigation. Patient was started on protonix and received total 4 units of blood for anemia. Patient was seen by gasteroenterologist who recommended EGD which showed gastritis. Patient had colonoscopy 6/11 which showed an oozing polyp which was resected and sent for biopsy. Patients hemoglobin remained stable during admission and patient was started on low fiber diet, Patient will need to follow up with GI after discharge to follow up on biopsy results.             VASHTI (acute kidney injury) likely in the setting of poor perfusion to kidneys, VASHTI resolved after transfusion         NSTEMI (non-ST elevated myocardial infarction): Patient asymptomatic. EKG shows NSR with 1st degree block, QTc 483, ST depression with T wave inversion in V4, V5, V6, likely due to severe anemia    Troponin were elevated but trended down. Echocardiogram showed: EF 55-60%, mildly increased RV pressure.         Atrial fibrillation: rate controlled during this admission and patient was continued on multaq and diltiazem. Eliquis was resumed before discharge after hemoglobin was noted to be stable        HTN (hypertension).: Home meds: multaq, diltiazem continued with parameter                Given patient's improved clinical status and current hemodynamic stability, decision was made to discharge.    Please refer to patient's complete medical chart with documents for a full hospital course, for this is only a brief summary. 80F from home, lives alone, PMH of short-term memory loss, A.fib, HTN, HLD presented to ED c/o weakness. Pt. is AAO X2 and complaints of generalized weakness and dizziness on walking. States she is always anemic and has not noticed any active bleeding or black stool. Also states that she is not taking any medications since long time. She endorses occasional shortness of breath and states she has had since childhood.    Spoke to daughter, Christina over phone. She lives in the same building with her mother and takes care of her, she gives her medications regularly. She states pt. was not feeling good since yesterday with loss of appetite and generalized weakness, which progressively got worst today, hence she called ambulance. When EMS arrived, they were suspicious of slurring of speech but daughter mentions that she did not have any slurring of speech, she was just weak. On further questioning, she states she did notice dark stool X 1 epsiode today, otherwise no prior active bleeding or black stool. Denies any numbness, tingling, weakness of extremities, difficulty swallowing, urinary complaints. (06 Jun 2020 23:54)        Patient is admitted for Symptomatic anemia: p/w generalized weakness with dizziness. hemoglobin was  4.6 on admission, black stool on rectal exam, stool positive for blood. Patient is on eliquis at home for atrial fibrillation    INR 1.36 on admission, decision made to hold eliquis until further investigation. Patient was started on protonix and received total 4 units of blood for anemia. Patient was seen by gasteroenterologist who recommended EGD which showed gastritis. Patient had colonoscopy 6/11 which showed an oozing polyp which was resected and sent for biopsy. Patients hemoglobin remained stable during admission and patient was started on low fiber diet, Patient will need to follow up with GI after discharge to follow up on biopsy results.             VASHTI (acute kidney injury) likely in the setting of poor perfusion to kidneys, VASHTI resolved after transfusion         NSTEMI (non-ST elevated myocardial infarction): Patient asymptomatic. EKG shows NSR with 1st degree block, QTc 483, ST depression with T wave inversion in V4, V5, V6, likely due to severe anemia    Troponin were elevated but trended down. Echocardiogram showed: EF 55-60%, mildly increased RV pressure.         Atrial fibrillation: rate controlled during this admission and patient was continued on multaq and diltiazem. Eliquis was resumed before discharge after hemoglobin was noted to be stable        HTN (hypertension).: Home meds: multaq, diltiazem continued with parameter                Given patient's improved clinical status and current hemodynamic stability, decision was made to discharge Home with Physical therapy    Please refer to patient's complete medical chart with documents for a full hospital course, for this is only a brief summary.

## 2020-06-11 NOTE — DIETITIAN INITIAL EVALUATION ADULT. - OTHER INFO
Pt alert, confused, poor historian, lives home PTA, asleep when visited today; decreased appetite x 2d PTA, NPO/Clear Liquid diet x 3 to 4d in-house, GI consult noted, s/p EGD 6/9/2020, NPO today for Colonoscopy, no wt loss per MD

## 2020-06-11 NOTE — DIETITIAN INITIAL EVALUATION ADULT. - PROBLEM SELECTOR PLAN 1
p/w generalized weakness with dizziness  H/H 4.6/15.5, black stool on rectal exam, FOBT positive, pt. on eliquis  INR 1.66  no recent use of steroid, NSAID, weight loss  likely anemia due to UGIB  on  clears   IV Protonix BID  will Transfuse 2 units of [facility]  CBC q6  GI Consult Dr Romano per MD

## 2020-06-11 NOTE — PROGRESS NOTE ADULT - SUBJECTIVE AND OBJECTIVE BOX
CARDIOLOGY     PROGRESS  NOTE   ________________________________________________    CHIEF COMPLAINT:Patient is a 80y old  Female who presents with a chief complaint of Weakness (10 Hans 2020 21:50)  no complain.  	  REVIEW OF SYSTEMS:  CONSTITUTIONAL: No fever, weight loss, or fatigue  EYES: No eye pain, visual disturbances, or discharge  ENT:  No difficulty hearing, tinnitus, vertigo; No sinus or throat pain  NECK: No pain or stiffness  RESPIRATORY: No cough, wheezing, chills or hemoptysis; No Shortness of Breath  CARDIOVASCULAR: No chest pain, palpitations, passing out, dizziness, or leg swelling  GASTROINTESTINAL: No abdominal or epigastric pain. No nausea, vomiting, or hematemesis; No diarrhea or constipation. No melena or hematochezia.  GENITOURINARY: No dysuria, frequency, hematuria, or incontinence  NEUROLOGICAL: No headaches, memory loss, loss of strength, numbness, or tremors  SKIN: No itching, burning, rashes, or lesions   LYMPH Nodes: No enlarged glands  ENDOCRINE: No heat or cold intolerance; No hair loss  MUSCULOSKELETAL: No joint pain or swelling; No muscle, back, or extremity pain  PSYCHIATRIC: No depression, anxiety, mood swings, or difficulty sleeping  HEME/LYMPH: No easy bruising, or bleeding gums  ALLERGY AND IMMUNOLOGIC: No hives or eczema	    [ ] All others negative	  [ ] Unable to obtain    PHYSICAL EXAM:  T(C): 37 (06-11-20 @ 07:14), Max: 37 (06-11-20 @ 07:14)  HR: 86 (06-11-20 @ 07:14) (80 - 86)  BP: 147/72 (06-11-20 @ 07:14) (111/80 - 151/79)  RR: 18 (06-11-20 @ 07:14) (18 - 18)  SpO2: 99% (06-11-20 @ 07:14) (99% - 100%)  Wt(kg): --  I&O's Summary      Appearance: Normal	  HEENT:   Normal oral mucosa, PERRL, EOMI	  Lymphatic: No lymphadenopathy  Cardiovascular: Normal S1 S2, No JVD, + murmurs, No edema  Respiratory: Lungs clear to auscultation	  Psychiatry: A & O x 3, Mood & affect appropriate  Gastrointestinal:  Soft, Non-tender, + BS	  Skin: No rashes, No ecchymoses, No cyanosis	  Neurologic: Non-focal  Extremities: Normal range of motion, No clubbing, cyanosis or edema  Vascular: Peripheral pulses palpable 2+ bilaterally    MEDICATIONS  (STANDING):  atorvastatin 80 milliGRAM(s) Oral at bedtime  diltiazem    milliGRAM(s) Oral daily  dronedarone 400 milliGRAM(s) Oral two times a day  ergocalciferol 21207 Unit(s) Oral <User Schedule>  lactated ringers 1000 milliLiter(s) (70 mL/Hr) IV Continuous <Continuous>  OLANZapine 2.5 milliGRAM(s) Oral at bedtime  pantoprazole  Injectable 40 milliGRAM(s) IV Push two times a day      TELEMETRY: 	    ECG:  	  RADIOLOGY:  OTHER: 	  	  LABS:	 	    CARDIAC MARKERS:                                8.6    5.80  )-----------( 206      ( 11 Jun 2020 06:38 )             27.7     06-11    142  |  109<H>  |  7   ----------------------------<  86  4.4   |  23  |  1.33<H>    Ca    8.3<L>      11 Jun 2020 06:38  Phos  3.8     06-11  Mg     3.3     06-11      proBNP:   Lipid Profile: Cholesterol 76  LDL 25  HDL 37  TG 69    HgA1c:   TSH: Thyroid Stimulating Hormone, Serum: 0.98 uU/mL (06-07 @ 09:35)          Assessment and plan  ---------------------------  80F from home, lives alone, PMH of short-term memory loss, A.fib, HTN, HLD presented to ED c/o weakness. Pt. is AAO X2 and complaints of generalized weakness and dizziness on walking. States she is always anemic and has not noticed any active bleeding or black stool. Also states that she is not taking any medications since long time. She endorses occasional shortness of breath and states she has had since childhood.  Spoke to daughter, Christina over phone. She lives in the same building with her mother and takes care of her, she gives her medications regularly. She states pt. was not feeling good since yesterday with loss of appetite and generalized weakness, which progressively got worst today, hence she called ambulance. When EMS arrived, they were suspicious of slurring of speech but daughter mentions that she did not have any slurring of speech, she was just weak. On further questioning, she states she did notice dark stool X 1 epsiode today, otherwise no prior active bleeding or black stool. Denies any numbness, tingling, weakness of extremities, difficulty swallowing, urinary complaints.  check orthostatic  observe on tele  echo noted  asa daily  PAF on held  Ac sec to ?ugi bleed  continue Cardizem and Multaq  fu bp closely  GI work up  no ac unless clear by GI  awaiting results of the gi work up  keep hgb >8  colonoscopy

## 2020-06-11 NOTE — DIETITIAN INITIAL EVALUATION ADULT. - PROBLEM SELECTOR PLAN 3
p/w BUN/Cr 27/1.42  daughter denies any renal issue before  likely pre-renal due to anemia  will transfuse PRBC  FU urine lytes, renal function per MD

## 2020-06-11 NOTE — DIETITIAN INITIAL EVALUATION ADULT. - PROBLEM SELECTOR PLAN 4
Pt. asymptomatic  T1 0.053  EKG shows NSR with 1st degree block, QTc 483, ST depression with T inv. in V4, V5, V6  likely due to severe anemia  tele monitor  will hold anti-platelet due to low Hb with FOBT positive  FU T2, T3  FU ECHO per MD

## 2020-06-11 NOTE — PROGRESS NOTE ADULT - ASSESSMENT
_________________________________________________________________________________________  ========>>  M E D I C A L   A T T E N D I N G    F O L L O W  U P  N O T E  <<=========  -----------------------------------------------------------------------------------------------------    - Patient seen and examined by me earlier today.   - In summary,  SAÚL RUSH is a 80y year old woman who originally presented with weakness   - Patient today overall doing ok, comfortable, no dizziness      no major events reported / noted otherwise     ==================>> REVIEW OF SYSTEM <<=================    GEN: no fever, no chills, no pain  RESP: no SOB, no cough, no sputum  CVS: no chest pain, no palpitations, no edema  GI: no abdominal pain, no nausea  : no dysuria, no frequency, no hematuria  Neuro: no headache, dizziness as above   Derm : no itching, no rash    ==================>> PHYSICAL EXAM <<=================    GEN: A&O X 3 , NAD , comfortable  HEENT: NCAT, PERRL, MMM, hearing intact  Neck: supple , no JVD appreciated  CVS: S1S2 , regular , No M/R/G appreciated  PULM: CTA B/L,  no W/R/R appreciated  ABD.: soft. non tender, non distended,  bowel sounds present  Extrem: intact pulses , no edema   PSYCH : normal mood,  not anxious       ==================>> MEDICATIONS <<====================    lactated ringers 1000 milliLiter(s) IV Continuous <Continuous>    ==================>> VITAL SIGNS <<==================    Vital Signs Last 24 HrsT(C): 37 (06-11-20 @ 07:14)  T(F): 98.6 (06-11-20 @ 07:14), Max: 98.6 (06-11-20 @ 07:14)  HR: 86 (06-11-20 @ 07:14) (80 - 86)  BP: 147/72 (06-11-20 @ 07:14)  RR: 18 (06-11-20 @ 07:14) (18 - 18)  SpO2: 99% (06-11-20 @ 07:14) (99% - 100%)        POCT Blood Glucose.: 92 mg/dL (11 Jun 2020 10:03)  POCT Blood Glucose.: 83 mg/dL (10 Hans 2020 11:25)     ==================>> LAB AND IMAGING <<==================                        8.6    5.80  )-----------( 206      ( 11 Jun 2020 06:38 )             27.7        06-11    142  |  109<H>  |  7   ----------------------------<  86  4.4   |  23  |  1.33<H>    Ca    8.3<L>      11 Jun 2020 06:38  Phos  3.8     06-11  Mg     3.3     06-11    WBC count:   5.80 <<== ,  5.18 <<== ,  5.58 <<== ,  6.66 <<== ,  7.27 <<== ,  6.63 <<==   Hemoglobin:   8.6 <<==,  8.7 <<==,  8.6 <<==,  7.1 <<==,  7.7 <<==,  7.8 <<==  platelets:  206 <==, 192 <==, 172 <==, 175 <==, 177 <==, 158 <==, 165 <==    Creatinine:  1.33  <<==, 1.06  <<==, 0.96  <<==, 1.17  <<==, 1.37  <<==, 1.42  <<==  Sodium:   142  <==, 141  <==, 143  <==, 141  <==, 140  <==, 142  <==       AST:          20 <== , 13 <== , 12 <==      ALT:        13  <== , 11  <== , 12  <==      AP:        41  <=, 43  <=, 44  <=     Bili:        0.7  <=, 0.9  <=, 0.7  <=     < from: Transthoracic Echocardiogram (06.07.20 @ 06:54) >  CONCLUSIONS:  1. Mitral annular calcification. Mild mitral regurgitation.  2. Calcified trileaflet aortic valve with normal opening.  Trace aortic regurgitation.  3. Aortic Root: 3.2 cm.  4. Moderately dilated left atrium.  LA volume index = 48 cc/m2.  5. Normal left ventricular internal dimensions and wall thicknesses.  6. Normal Left Ventricular Systolic Function,  (EF = 55 to 60%)  7. Normal diastolic function.  8. Normal right atrium.  9. Normal right ventricular size and systolic function (TAPSE  2.6cm).  10. RA Pressure is 8 mm Hg.  11. RV systolic pressure is mildly increased at  44 mm Hg.  12. There is mild tricuspid regurgitation.  13. There is mild pulmonic regurgitation.  14. Normal pericardium with no pericardial effusion.  < end of copied text >    ENDOSCOPY:  Impression: 1- Mild gastritis 2- No evidence of Gi bleeding   Plan: 1- Clear liquid diet 2- Magnesium Citrate 300 ml tonight 3- Magnesium Citrate 300 mg tomorrow 4-Colonoscopy on Thursday .     ___________________________________________________________________________________  ===============>>  A S S E S S M E N T   A N D   P L A N <<===============  ------------------------------------------------------------------------------------------    · Assessment		  80F from home, lives alone, PMH of short-term memory loss, A.fib, HTN, HLD presented to ED c/o weakness. Admitted for severe symptomatic anemia likely GIB, ARF, NSTEMI.    Problem/Plan - 1:  ·  Problem: Symptomatic anemia: improved post transfusion   Protonix   Transfuse further PBRC as needed to optimize for GI workup   CBC close monitoring   GI appreciated and discussed   colonoscopy pending      Problem/Plan - 2:  ·  Problem:  VASHTI  resolved   likely pre-renal due to anemia  monitor     Problem/Plan - 3:  ·  Problem: elevated troponin in setting of severe anemia with EKG changes      likely demand ischemia   cardio following   will hold anti-platelet due to low Hb with FOBT positive   ECHO as above     Problem/Plan - 4:  ·  Problem: h/o Atrial fibrillation.  rate controlled  cardio following and appreciated   hold eliquis and asa due to FOBT positive with low Hb.   need further eval and decision making on resuming Eliquis     Problem/Plan - 5:  Problem: HTN   Continue Current medications otherwise and monitor.   cardio following     Problem/Plan - 6:  ·  Problem: HLD   on atorvastatin 80mg at home    GI/ DVT PPX  encourage OOB / ambulate with assist    --------------------------------------------  Case discussed with pt, staff   Education given on findings and plan of care  ___________________________  H. AJ Coulter.  Pager: [phone]

## 2020-06-11 NOTE — DIETITIAN INITIAL EVALUATION ADULT. - PERTINENT LABORATORY DATA
06-11 Na142 mmol/L Glu 86 mg/dL K+ 4.4 mmol/L Cr  1.33 mg/dL<H> BUN 7 mg/dL   06-11 Phos 3.8 mg/dL   06-08 Alb 2.7 g/dL<L>       06-07 Chol 76 mg/dL LDL 25 mg/dL HDL 37 mg/dL<L> Trig 69 mg/dL

## 2020-06-11 NOTE — PROGRESS NOTE ADULT - PROBLEM SELECTOR PLAN 1
p/w generalized weakness with dizziness  H/H 4.6/15.5 on admission, black stool on rectal exam, FOBT positive, pt. on eliquis  INR 1.36, holding eliquis  no recent use of steroid, NSAID, weight loss  likely anemia due to GIB  EGD showed gastritis   on  clears   pending colonoscopy 6/11  IV Protonix BID  [facility]  [facility] q24  GI Consult Dr Romano

## 2020-06-11 NOTE — DIETITIAN INITIAL EVALUATION ADULT. - NS FNS WEIGHT USED FOR CALC
usual/Ht=5' 6"   XGY=445 lb   Admission po=019 lb   BMI=27.4   Current wt= 179 lb 6/11/2020   ? changes, may due to scale/fluid variance

## 2020-06-12 ENCOUNTER — TRANSCRIPTION ENCOUNTER (OUTPATIENT)
Age: 80
End: 2020-06-12

## 2020-06-12 VITALS
TEMPERATURE: 98 F | OXYGEN SATURATION: 96 % | HEART RATE: 74 BPM | RESPIRATION RATE: 18 BRPM | DIASTOLIC BLOOD PRESSURE: 65 MMHG | SYSTOLIC BLOOD PRESSURE: 103 MMHG

## 2020-06-12 LAB
ANION GAP SERPL CALC-SCNC: 10 MMOL/L — SIGNIFICANT CHANGE UP (ref 5–17)
BUN SERPL-MCNC: 19 MG/DL — HIGH (ref 7–18)
CALCIUM SERPL-MCNC: 7.6 MG/DL — LOW (ref 8.4–10.5)
CHLORIDE SERPL-SCNC: 108 MMOL/L — SIGNIFICANT CHANGE UP (ref 96–108)
CO2 SERPL-SCNC: 23 MMOL/L — SIGNIFICANT CHANGE UP (ref 22–31)
CREAT SERPL-MCNC: 1.45 MG/DL — HIGH (ref 0.5–1.3)
GLUCOSE BLDC GLUCOMTR-MCNC: 109 MG/DL — HIGH (ref 70–99)
GLUCOSE BLDC GLUCOMTR-MCNC: 97 MG/DL — SIGNIFICANT CHANGE UP (ref 70–99)
GLUCOSE SERPL-MCNC: 91 MG/DL — SIGNIFICANT CHANGE UP (ref 70–99)
HCT VFR BLD CALC: 25.2 % — LOW (ref 34.5–45)
HGB BLD-MCNC: 8 G/DL — LOW (ref 11.5–15.5)
MAGNESIUM SERPL-MCNC: 3 MG/DL — HIGH (ref 1.6–2.6)
MCHC RBC-ENTMCNC: 29.5 PG — SIGNIFICANT CHANGE UP (ref 27–34)
MCHC RBC-ENTMCNC: 31.7 GM/DL — LOW (ref 32–36)
MCV RBC AUTO: 93 FL — SIGNIFICANT CHANGE UP (ref 80–100)
NRBC # BLD: 0 /100 WBCS — SIGNIFICANT CHANGE UP (ref 0–0)
PHOSPHATE SERPL-MCNC: 3.9 MG/DL — SIGNIFICANT CHANGE UP (ref 2.5–4.5)
PLATELET # BLD AUTO: 200 K/UL — SIGNIFICANT CHANGE UP (ref 150–400)
POTASSIUM SERPL-MCNC: 3.9 MMOL/L — SIGNIFICANT CHANGE UP (ref 3.5–5.3)
POTASSIUM SERPL-SCNC: 3.9 MMOL/L — SIGNIFICANT CHANGE UP (ref 3.5–5.3)
RBC # BLD: 2.71 M/UL — LOW (ref 3.8–5.2)
RBC # FLD: 17.3 % — HIGH (ref 10.3–14.5)
SODIUM SERPL-SCNC: 141 MMOL/L — SIGNIFICANT CHANGE UP (ref 135–145)
WBC # BLD: 5.61 K/UL — SIGNIFICANT CHANGE UP (ref 3.8–10.5)
WBC # FLD AUTO: 5.61 K/UL — SIGNIFICANT CHANGE UP (ref 3.8–10.5)

## 2020-06-12 PROCEDURE — 83735 ASSAY OF MAGNESIUM: CPT

## 2020-06-12 PROCEDURE — 85027 COMPLETE CBC AUTOMATED: CPT

## 2020-06-12 PROCEDURE — 84300 ASSAY OF URINE SODIUM: CPT

## 2020-06-12 PROCEDURE — 82272 OCCULT BLD FECES 1-3 TESTS: CPT

## 2020-06-12 PROCEDURE — 82570 ASSAY OF URINE CREATININE: CPT

## 2020-06-12 PROCEDURE — 86901 BLOOD TYPING SEROLOGIC RH(D): CPT

## 2020-06-12 PROCEDURE — 97116 GAIT TRAINING THERAPY: CPT

## 2020-06-12 PROCEDURE — 84484 ASSAY OF TROPONIN QUANT: CPT

## 2020-06-12 PROCEDURE — 82553 CREATINE MB FRACTION: CPT

## 2020-06-12 PROCEDURE — 82962 GLUCOSE BLOOD TEST: CPT

## 2020-06-12 PROCEDURE — 86900 BLOOD TYPING SEROLOGIC ABO: CPT

## 2020-06-12 PROCEDURE — 82550 ASSAY OF CK (CPK): CPT

## 2020-06-12 PROCEDURE — 82728 ASSAY OF FERRITIN: CPT

## 2020-06-12 PROCEDURE — 93306 TTE W/DOPPLER COMPLETE: CPT

## 2020-06-12 PROCEDURE — 80048 BASIC METABOLIC PNL TOTAL CA: CPT

## 2020-06-12 PROCEDURE — 97161 PT EVAL LOW COMPLEX 20 MIN: CPT

## 2020-06-12 PROCEDURE — 99291 CRITICAL CARE FIRST HOUR: CPT | Mod: 25

## 2020-06-12 PROCEDURE — P9040: CPT

## 2020-06-12 PROCEDURE — 80061 LIPID PANEL: CPT

## 2020-06-12 PROCEDURE — 83036 HEMOGLOBIN GLYCOSYLATED A1C: CPT

## 2020-06-12 PROCEDURE — 80053 COMPREHEN METABOLIC PANEL: CPT

## 2020-06-12 PROCEDURE — 36430 TRANSFUSION BLD/BLD COMPNT: CPT

## 2020-06-12 PROCEDURE — 36415 COLL VENOUS BLD VENIPUNCTURE: CPT

## 2020-06-12 PROCEDURE — 84100 ASSAY OF PHOSPHORUS: CPT

## 2020-06-12 PROCEDURE — 97530 THERAPEUTIC ACTIVITIES: CPT

## 2020-06-12 PROCEDURE — 86850 RBC ANTIBODY SCREEN: CPT

## 2020-06-12 PROCEDURE — 82607 VITAMIN B-12: CPT

## 2020-06-12 PROCEDURE — 85730 THROMBOPLASTIN TIME PARTIAL: CPT

## 2020-06-12 PROCEDURE — 88312 SPECIAL STAINS GROUP 1: CPT

## 2020-06-12 PROCEDURE — U0003: CPT

## 2020-06-12 PROCEDURE — 93005 ELECTROCARDIOGRAM TRACING: CPT

## 2020-06-12 PROCEDURE — 86923 COMPATIBILITY TEST ELECTRIC: CPT

## 2020-06-12 PROCEDURE — 88305 TISSUE EXAM BY PATHOLOGIST: CPT

## 2020-06-12 PROCEDURE — 84443 ASSAY THYROID STIM HORMONE: CPT

## 2020-06-12 PROCEDURE — 96374 THER/PROPH/DIAG INJ IV PUSH: CPT

## 2020-06-12 PROCEDURE — 81001 URINALYSIS AUTO W/SCOPE: CPT

## 2020-06-12 PROCEDURE — 97110 THERAPEUTIC EXERCISES: CPT

## 2020-06-12 PROCEDURE — 71045 X-RAY EXAM CHEST 1 VIEW: CPT

## 2020-06-12 PROCEDURE — 83540 ASSAY OF IRON: CPT

## 2020-06-12 PROCEDURE — 83550 IRON BINDING TEST: CPT

## 2020-06-12 PROCEDURE — 82306 VITAMIN D 25 HYDROXY: CPT

## 2020-06-12 PROCEDURE — C1889: CPT

## 2020-06-12 PROCEDURE — 70450 CT HEAD/BRAIN W/O DYE: CPT

## 2020-06-12 PROCEDURE — 85610 PROTHROMBIN TIME: CPT

## 2020-06-12 RX ORDER — APIXABAN 2.5 MG/1
1 TABLET, FILM COATED ORAL
Qty: 60 | Refills: 0
Start: 2020-06-12 | End: 2020-07-11

## 2020-06-12 RX ORDER — FERROUS SULFATE 325(65) MG
1 TABLET ORAL
Qty: 30 | Refills: 0
Start: 2020-06-12 | End: 2020-07-11

## 2020-06-12 RX ORDER — DILTIAZEM HCL 120 MG
1 CAPSULE, EXT RELEASE 24 HR ORAL
Qty: 30 | Refills: 0
Start: 2020-06-12 | End: 2020-07-11

## 2020-06-12 RX ORDER — APIXABAN 2.5 MG/1
1 TABLET, FILM COATED ORAL
Qty: 0 | Refills: 0 | DISCHARGE

## 2020-06-12 RX ORDER — IRON SUCROSE 20 MG/ML
200 INJECTION, SOLUTION INTRAVENOUS EVERY 24 HOURS
Refills: 0 | Status: DISCONTINUED | OUTPATIENT
Start: 2020-06-12 | End: 2020-06-12

## 2020-06-12 RX ORDER — PANTOPRAZOLE SODIUM 20 MG/1
1 TABLET, DELAYED RELEASE ORAL
Qty: 30 | Refills: 0
Start: 2020-06-12 | End: 2020-07-11

## 2020-06-12 RX ORDER — FERROUS SULFATE 325(65) MG
325 TABLET ORAL DAILY
Refills: 0 | Status: CANCELLED | OUTPATIENT
Start: 2020-06-14 | End: 2020-06-12

## 2020-06-12 RX ORDER — ERGOCALCIFEROL 1.25 MG/1
1 CAPSULE ORAL
Qty: 8 | Refills: 0
Start: 2020-06-12 | End: 2020-06-19

## 2020-06-12 RX ORDER — DILTIAZEM HCL 120 MG
1 CAPSULE, EXT RELEASE 24 HR ORAL
Qty: 0 | Refills: 0 | DISCHARGE

## 2020-06-12 RX ORDER — APIXABAN 2.5 MG/1
2.5 TABLET, FILM COATED ORAL EVERY 12 HOURS
Refills: 0 | Status: DISCONTINUED | OUTPATIENT
Start: 2020-06-12 | End: 2020-06-12

## 2020-06-12 RX ADMIN — Medication 180 MILLIGRAM(S): at 05:36

## 2020-06-12 RX ADMIN — DRONEDARONE 400 MILLIGRAM(S): 400 TABLET, FILM COATED ORAL at 05:36

## 2020-06-12 RX ADMIN — PANTOPRAZOLE SODIUM 40 MILLIGRAM(S): 20 TABLET, DELAYED RELEASE ORAL at 05:37

## 2020-06-12 NOTE — PROGRESS NOTE ADULT - ASSESSMENT
_________________________________________________________________________________________  ========>>  M E D I C A L   A T T E N D I N G    F O L L O W  U P  N O T E  <<=========  -----------------------------------------------------------------------------------------------------    - Patient seen and examined by me earlier today.   - In summary,  SAÚL RUSH is a 80y year old woman who originally presented with weakness   - Patient today overall doing ok, comfortable, no dizziness      no major events reported / noted otherwise     ==================>> REVIEW OF SYSTEM <<=================    GEN: no fever, no chills, no pain  RESP: no SOB, no cough, no sputum  CVS: no chest pain, no palpitations, no edema  GI: no abdominal pain, no nausea  : no dysuria, no frequency, no hematuria  Neuro: no headache, dizziness as above   Derm : no itching, no rash    ==================>> PHYSICAL EXAM <<=================    GEN: A&O X 3 , NAD , comfortable  HEENT: NCAT, PERRL, MMM, hearing intact  Neck: supple , no JVD appreciated  CVS: S1S2 , regular , No M/R/G appreciated  PULM: CTA B/L,  no W/R/R appreciated  ABD.: soft. non tender, non distended,  bowel sounds present  Extrem: intact pulses , no edema   PSYCH : normal mood,  not anxious        ==================>> MEDICATIONS <<====================    apixaban 2.5 milliGRAM(s) Oral every 12 hours  atorvastatin 80 milliGRAM(s) Oral at bedtime  diltiazem    milliGRAM(s) Oral daily  dronedarone 400 milliGRAM(s) Oral two times a day  ergocalciferol 53934 Unit(s) Oral <User Schedule>  pantoprazole    Tablet 40 milliGRAM(s) Oral before breakfast    ___________  Active diet:  Diet, Low Fiber:   DASH/TLC Sodium & Cholesterol Restricted  ___________________    ==================>> VITAL SIGNS <<==================    Vital Signs Last 24 HrsT(C): 36.6 (06-12-20 @ 11:35)  T(F): 97.9 (06-12-20 @ 11:35), Max: 98.2 (06-12-20 @ 07:41)  HR: 74 (06-12-20 @ 11:35) (73 - 88)  BP: 103/65 (06-12-20 @ 11:35)  RR: 18 (06-12-20 @ 11:35) (17 - 20)  SpO2: 96% (06-12-20 @ 11:35) (95% - 100%)      POCT Blood Glucose.: 97 mg/dL (12 Jun 2020 11:22)  POCT Blood Glucose.: 109 mg/dL (12 Jun 2020 07:58)     ==================>> LAB AND IMAGING <<==================                        8.0    5.61  )-----------( 200      ( 12 Jun 2020 07:14 )             25.2        06-12    141  |  108  |  19<H>  ----------------------------<  91  3.9   |  23  |  1.45<H>    Ca    7.6<L>      12 Jun 2020 07:14  Phos  3.9     06-12  Mg     3.0     06-12      WBC count:   5.61 <<== ,  5.80 <<== ,  5.18 <<== ,  5.58 <<== ,  6.66 <<== ,  7.27 <<==   Hemoglobin:   8.0 <<==,  8.6 <<==,  8.7 <<==,  8.6 <<==,  7.1 <<==,  7.7 <<==  platelets:  200 <==, 206 <==, 192 <==, 172 <==, 175 <==, 177 <==, 158 <==    Creatinine:  1.45  <<==, 1.33  <<==, 1.06  <<==, 0.96  <<==, 1.17  <<==, 1.37  <<==  Sodium:   141  <==, 142  <==, 141  <==, 143  <==, 141  <==, 140  <==, 142  <==       < from: Transthoracic Echocardiogram (06.07.20 @ 06:54) >  CONCLUSIONS:  1. Mitral annular calcification. Mild mitral regurgitation.  2. Calcified trileaflet aortic valve with normal opening.  Trace aortic regurgitation.  3. Aortic Root: 3.2 cm.  4. Moderately dilated left atrium.  LA volume index = 48 cc/m2.  5. Normal left ventricular internal dimensions and wall thicknesses.  6. Normal Left Ventricular Systolic Function,  (EF = 55 to 60%)  7. Normal diastolic function.  8. Normal right atrium.  9. Normal right ventricular size and systolic function (TAPSE  2.6cm).  10. RA Pressure is 8 mm Hg.  11. RV systolic pressure is mildly increased at  44 mm Hg.  12. There is mild tricuspid regurgitation.  13. There is mild pulmonic regurgitation.  14. Normal pericardium with no pericardial effusion.  < end of copied text >    ENDOSCOPY:  Impression: 1- Mild gastritis 2- No evidence of Gi bleeding   Plan: 1- Clear liquid diet 2- Magnesium Citrate 300 ml tonight 3- Magnesium Citrate 300 mg tomorrow 4-Colonoscopy on Thursday .     Colonoscopy  Impression:  1- Colon polyps s.p EMR   Plan: Advance diet 2- Restart AC tomorrow morning 3- Follow up pathology 4- Repeat Colonoscopy in 3 years 5- Discharge as per primary team     ___________________________________________________________________________________  ===============>>  A S S E S S M E N T   A N D   P L A N <<===============  ------------------------------------------------------------------------------------------    · Assessment		  80F from home, lives alone, PMH of short-term memory loss, A.fib, HTN, HLD presented to ED c/o weakness. Admitted for severe symptomatic anemia likely GIB, ARF, NSTEMI.    Problem/Plan - 1:  ·  Problem: Symptomatic anemia: improved post transfusion   likely bleeding from colonic polyp >> removed       discussed with Gi: ok to resume AC   Protonix   CBC close monitoring   GI appreciated and discussed   colonoscopy pending      Problem/Plan - 2:  ·  Problem:  VASHTI  resolved   likely pre-renal due to anemia  monitor   elevated crea in setting of NPO and GI prep >> encouraged PO intake and monitor in rehab    Problem/Plan - 3:  ·  Problem: elevated troponin in setting of severe anemia with EKG changes      likely demand ischemia   cardio following   will hold anti-platelet due to low Hb with FOBT positive   ECHO as above     Problem/Plan - 4:  ·  Problem: h/o Atrial fibrillation.  rate controlled  cardio following and appreciated   eliquis resumed>> monitor hgl closely in rehab     Problem/Plan - 5:  Problem: HTN   Continue Current medications otherwise and monitor.   cardio following     Problem/Plan - 6:  ·  Problem: HLD   on atorvastatin 80mg at home    GI/ DVT PPX  encourage OOB / ambulate with assist    --------------------------------------------  Case discussed with pt, staff   Education given on findings and plan of care  ___________________________  H. AJ Coulter.  Pager: [phone] _________________________________________________________________________________________  ========>>  M E D I C A L   A T T E N D I N G    F O L L O W  U P  N O T E  <<=========  -----------------------------------------------------------------------------------------------------    - Patient seen and examined by me earlier today.   - In summary,  SAÚL RUSH is a 80y year old woman who originally presented with weakness   - Patient today overall doing ok, comfortable, no dizziness      no major events reported / noted otherwise     ==================>> REVIEW OF SYSTEM <<=================    GEN: no fever, no chills, no pain  RESP: no SOB, no cough, no sputum  CVS: no chest pain, no palpitations, no edema  GI: no abdominal pain, no nausea  : no dysuria, no frequency, no hematuria  Neuro: no headache, dizziness as above   Derm : no itching, no rash    ==================>> PHYSICAL EXAM <<=================    GEN: A&O X 3 , NAD , comfortable  HEENT: NCAT, PERRL, MMM, hearing intact  Neck: supple , no JVD appreciated  CVS: S1S2 , regular , No M/R/G appreciated  PULM: CTA B/L,  no W/R/R appreciated  ABD.: soft. non tender, non distended,  bowel sounds present  Extrem: intact pulses , no edema   PSYCH : normal mood,  not anxious        ==================>> MEDICATIONS <<====================    apixaban 2.5 milliGRAM(s) Oral every 12 hours  atorvastatin 80 milliGRAM(s) Oral at bedtime  diltiazem    milliGRAM(s) Oral daily  dronedarone 400 milliGRAM(s) Oral two times a day  ergocalciferol 53296 Unit(s) Oral <User Schedule>  pantoprazole    Tablet 40 milliGRAM(s) Oral before breakfast    ___________  Active diet:  Diet, Low Fiber:   DASH/TLC Sodium & Cholesterol Restricted  ___________________    ==================>> VITAL SIGNS <<==================    Vital Signs Last 24 HrsT(C): 36.6 (06-12-20 @ 11:35)  T(F): 97.9 (06-12-20 @ 11:35), Max: 98.2 (06-12-20 @ 07:41)  HR: 74 (06-12-20 @ 11:35) (73 - 88)  BP: 103/65 (06-12-20 @ 11:35)  RR: 18 (06-12-20 @ 11:35) (17 - 20)  SpO2: 96% (06-12-20 @ 11:35) (95% - 100%)      POCT Blood Glucose.: 97 mg/dL (12 Jun 2020 11:22)  POCT Blood Glucose.: 109 mg/dL (12 Jun 2020 07:58)     ==================>> LAB AND IMAGING <<==================                        8.0    5.61  )-----------( 200      ( 12 Jun 2020 07:14 )             25.2        06-12    141  |  108  |  19<H>  ----------------------------<  91  3.9   |  23  |  1.45<H>    Ca    7.6<L>      12 Jun 2020 07:14  Phos  3.9     06-12  Mg     3.0     06-12      WBC count:   5.61 <<== ,  5.80 <<== ,  5.18 <<== ,  5.58 <<== ,  6.66 <<== ,  7.27 <<==   Hemoglobin:   8.0 <<==,  8.6 <<==,  8.7 <<==,  8.6 <<==,  7.1 <<==,  7.7 <<==  platelets:  200 <==, 206 <==, 192 <==, 172 <==, 175 <==, 177 <==, 158 <==    Creatinine:  1.45  <<==, 1.33  <<==, 1.06  <<==, 0.96  <<==, 1.17  <<==, 1.37  <<==  Sodium:   141  <==, 142  <==, 141  <==, 143  <==, 141  <==, 140  <==, 142  <==       < from: Transthoracic Echocardiogram (06.07.20 @ 06:54) >  CONCLUSIONS:  1. Mitral annular calcification. Mild mitral regurgitation.  2. Calcified trileaflet aortic valve with normal opening.  Trace aortic regurgitation.  3. Aortic Root: 3.2 cm.  4. Moderately dilated left atrium.  LA volume index = 48 cc/m2.  5. Normal left ventricular internal dimensions and wall thicknesses.  6. Normal Left Ventricular Systolic Function,  (EF = 55 to 60%)  7. Normal diastolic function.  8. Normal right atrium.  9. Normal right ventricular size and systolic function (TAPSE  2.6cm).  10. RA Pressure is 8 mm Hg.  11. RV systolic pressure is mildly increased at  44 mm Hg.  12. There is mild tricuspid regurgitation.  13. There is mild pulmonic regurgitation.  14. Normal pericardium with no pericardial effusion.  < end of copied text >    ENDOSCOPY:  Impression: 1- Mild gastritis 2- No evidence of Gi bleeding   Plan: 1- Clear liquid diet 2- Magnesium Citrate 300 ml tonight 3- Magnesium Citrate 300 mg tomorrow 4-Colonoscopy on Thursday .     Colonoscopy  Impression:  1- Colon polyps s.p EMR   Plan: Advance diet 2- Restart AC tomorrow morning 3- Follow up pathology 4- Repeat Colonoscopy in 3 years 5- Discharge as per primary team     ___________________________________________________________________________________  ===============>>  A S S E S S M E N T   A N D   P L A N <<===============  ------------------------------------------------------------------------------------------    · Assessment		  80F from home, lives alone, PMH of short-term memory loss, A.fib, HTN, HLD presented to ED c/o weakness. Admitted for severe symptomatic anemia likely GIB, ARF, NSTEMI.    Problem/Plan - 1:  ·  Problem: Symptomatic anemia: improved post transfusion   likely bleeding from colonic polyp >> removed       discussed with Gi: ok to resume AC   Protonix   CBC close monitoring   GI appreciated and discussed   colonoscopy pending      Problem/Plan - 2:  ·  Problem:  VASHTI  resolved   likely pre-renal due to anemia  monitor   elevated crea in setting of NPO and GI prep >> encouraged PO intake and monitor in rehab    Problem/Plan - 3:  ·  Problem: elevated troponin in setting of severe anemia with EKG changes      likely demand ischemia   cardio following   will hold anti-platelet due to low Hb with FOBT positive   ECHO as above     Problem/Plan - 4:  ·  Problem: h/o Atrial fibrillation.  rate controlled  cardio following and appreciated   eliquis resumed>> monitor hgl closely in rehab     Problem/Plan - 5:  Problem: HTN   Continue Current medications otherwise and monitor.   cardio following     Problem/Plan - 6:  ·  Problem: HLD   on atorvastatin 80mg at home    GI/ DVT PPX  encourage OOB / ambulate with assist    as discussed, if pt goes home instead of rehab, needs repeat blood tests done Monday for CBC and bmp    --------------------------------------------  Case discussed with pt, staff   Education given on findings and plan of care  ___________________________  H. AJ Coulter.  Pager: [phone]

## 2020-06-12 NOTE — DISCHARGE NOTE NURSING/CASE MANAGEMENT/SOCIAL WORK - PATIENT PORTAL LINK FT
You can access the FollowMyHealth Patient Portal offered by [facility] by registering at the following website: [email or web address]. By joining [facility]’s FollowMyHealth portal, you will also be able to view your health information using other applications (apps) compatible with our system.

## 2020-06-12 NOTE — PROGRESS NOTE ADULT - REASON FOR ADMISSION
Weakness

## 2021-06-18 ENCOUNTER — INPATIENT (INPATIENT)
Facility: HOSPITAL | Age: 81
LOS: 3 days | Discharge: HOME | DRG: 309 | End: 2021-06-22
Attending: INTERNAL MEDICINE | Admitting: INTERNAL MEDICINE
Payer: MEDICARE

## 2021-06-18 VITALS
RESPIRATION RATE: 16 BRPM | SYSTOLIC BLOOD PRESSURE: 156 MMHG | TEMPERATURE: 98 F | HEART RATE: 99 BPM | HEIGHT: 66 IN | DIASTOLIC BLOOD PRESSURE: 107 MMHG | OXYGEN SATURATION: 98 % | WEIGHT: 160.06 LBS

## 2021-06-18 DIAGNOSIS — I21.4 NON-ST ELEVATION (NSTEMI) MYOCARDIAL INFARCTION: ICD-10-CM

## 2021-06-18 PROBLEM — I48.91 UNSPECIFIED ATRIAL FIBRILLATION: Chronic | Status: ACTIVE | Noted: 2020-06-06

## 2021-06-18 PROBLEM — E11.9 TYPE 2 DIABETES MELLITUS WITHOUT COMPLICATIONS: Chronic | Status: ACTIVE | Noted: 2020-06-06

## 2021-06-18 PROBLEM — I10 ESSENTIAL (PRIMARY) HYPERTENSION: Chronic | Status: ACTIVE | Noted: 2020-06-06

## 2021-06-18 PROBLEM — E78.5 HYPERLIPIDEMIA, UNSPECIFIED: Chronic | Status: ACTIVE | Noted: 2020-06-06

## 2021-06-18 LAB
ALBUMIN SERPL ELPH-MCNC: 3.6 G/DL — SIGNIFICANT CHANGE UP (ref 3.5–5)
ALP SERPL-CCNC: 68 U/L — SIGNIFICANT CHANGE UP (ref 40–120)
ALT FLD-CCNC: 26 U/L DA — SIGNIFICANT CHANGE UP (ref 10–60)
ANION GAP SERPL CALC-SCNC: 8 MMOL/L — SIGNIFICANT CHANGE UP (ref 5–17)
AST SERPL-CCNC: 25 U/L — SIGNIFICANT CHANGE UP (ref 10–40)
BASOPHILS # BLD AUTO: 0.03 K/UL — SIGNIFICANT CHANGE UP (ref 0–0.2)
BASOPHILS NFR BLD AUTO: 0.7 % — SIGNIFICANT CHANGE UP (ref 0–2)
BILIRUB SERPL-MCNC: 1.4 MG/DL — HIGH (ref 0.2–1.2)
BUN SERPL-MCNC: 19 MG/DL — HIGH (ref 7–18)
CALCIUM SERPL-MCNC: 9.3 MG/DL — SIGNIFICANT CHANGE UP (ref 8.4–10.5)
CHLORIDE SERPL-SCNC: 109 MMOL/L — HIGH (ref 96–108)
CK SERPL-CCNC: 238 U/L — HIGH (ref 21–215)
CO2 SERPL-SCNC: 24 MMOL/L — SIGNIFICANT CHANGE UP (ref 22–31)
CREAT SERPL-MCNC: 1.51 MG/DL — HIGH (ref 0.5–1.3)
EOSINOPHIL # BLD AUTO: 0.03 K/UL — SIGNIFICANT CHANGE UP (ref 0–0.5)
EOSINOPHIL NFR BLD AUTO: 0.7 % — SIGNIFICANT CHANGE UP (ref 0–6)
GLUCOSE SERPL-MCNC: 96 MG/DL — SIGNIFICANT CHANGE UP (ref 70–99)
HCT VFR BLD CALC: 47.3 % — HIGH (ref 34.5–45)
HGB BLD-MCNC: 15.5 G/DL — SIGNIFICANT CHANGE UP (ref 11.5–15.5)
IMM GRANULOCYTES NFR BLD AUTO: 0 % — SIGNIFICANT CHANGE UP (ref 0–1.5)
LYMPHOCYTES # BLD AUTO: 1.47 K/UL — SIGNIFICANT CHANGE UP (ref 1–3.3)
LYMPHOCYTES # BLD AUTO: 32.7 % — SIGNIFICANT CHANGE UP (ref 13–44)
MCHC RBC-ENTMCNC: 31.3 PG — SIGNIFICANT CHANGE UP (ref 27–34)
MCHC RBC-ENTMCNC: 32.8 GM/DL — SIGNIFICANT CHANGE UP (ref 32–36)
MCV RBC AUTO: 95.6 FL — SIGNIFICANT CHANGE UP (ref 80–100)
MONOCYTES # BLD AUTO: 0.39 K/UL — SIGNIFICANT CHANGE UP (ref 0–0.9)
MONOCYTES NFR BLD AUTO: 8.7 % — SIGNIFICANT CHANGE UP (ref 2–14)
NEUTROPHILS # BLD AUTO: 2.57 K/UL — SIGNIFICANT CHANGE UP (ref 1.8–7.4)
NEUTROPHILS NFR BLD AUTO: 57.2 % — SIGNIFICANT CHANGE UP (ref 43–77)
NRBC # BLD: 0 /100 WBCS — SIGNIFICANT CHANGE UP (ref 0–0)
NT-PROBNP SERPL-SCNC: 2784 PG/ML — HIGH (ref 0–450)
PLATELET # BLD AUTO: 159 K/UL — SIGNIFICANT CHANGE UP (ref 150–400)
POTASSIUM SERPL-MCNC: 4 MMOL/L — SIGNIFICANT CHANGE UP (ref 3.5–5.3)
POTASSIUM SERPL-SCNC: 4 MMOL/L — SIGNIFICANT CHANGE UP (ref 3.5–5.3)
PROT SERPL-MCNC: 8.3 G/DL — SIGNIFICANT CHANGE UP (ref 6–8.3)
RBC # BLD: 4.95 M/UL — SIGNIFICANT CHANGE UP (ref 3.8–5.2)
RBC # FLD: 15.5 % — HIGH (ref 10.3–14.5)
SARS-COV-2 RNA SPEC QL NAA+PROBE: SIGNIFICANT CHANGE UP
SODIUM SERPL-SCNC: 141 MMOL/L — SIGNIFICANT CHANGE UP (ref 135–145)
TROPONIN I SERPL-MCNC: 0.07 NG/ML — HIGH (ref 0–0.04)
WBC # BLD: 4.49 K/UL — SIGNIFICANT CHANGE UP (ref 3.8–10.5)
WBC # FLD AUTO: 4.49 K/UL — SIGNIFICANT CHANGE UP (ref 3.8–10.5)

## 2021-06-18 PROCEDURE — 70450 CT HEAD/BRAIN W/O DYE: CPT | Mod: 26,MA

## 2021-06-18 PROCEDURE — 71045 X-RAY EXAM CHEST 1 VIEW: CPT | Mod: 26

## 2021-06-18 PROCEDURE — 99285 EMERGENCY DEPT VISIT HI MDM: CPT

## 2021-06-18 PROCEDURE — 99223 1ST HOSP IP/OBS HIGH 75: CPT

## 2021-06-18 RX ORDER — ATORVASTATIN CALCIUM 80 MG/1
40 TABLET, FILM COATED ORAL AT BEDTIME
Refills: 0 | Status: DISCONTINUED | OUTPATIENT
Start: 2021-06-18 | End: 2021-06-22

## 2021-06-18 RX ORDER — DRONEDARONE 400 MG/1
400 TABLET, FILM COATED ORAL
Refills: 0 | Status: DISCONTINUED | OUTPATIENT
Start: 2021-06-18 | End: 2021-06-22

## 2021-06-18 RX ORDER — SODIUM CHLORIDE 9 MG/ML
1000 INJECTION INTRAMUSCULAR; INTRAVENOUS; SUBCUTANEOUS ONCE
Refills: 0 | Status: COMPLETED | OUTPATIENT
Start: 2021-06-18 | End: 2021-06-18

## 2021-06-18 RX ORDER — HEPARIN SODIUM 5000 [USP'U]/ML
5000 INJECTION INTRAVENOUS; SUBCUTANEOUS EVERY 8 HOURS
Refills: 0 | Status: DISCONTINUED | OUTPATIENT
Start: 2021-06-18 | End: 2021-06-22

## 2021-06-18 RX ORDER — ASPIRIN/CALCIUM CARB/MAGNESIUM 324 MG
81 TABLET ORAL DAILY
Refills: 0 | Status: DISCONTINUED | OUTPATIENT
Start: 2021-06-18 | End: 2021-06-22

## 2021-06-18 RX ORDER — ASPIRIN/CALCIUM CARB/MAGNESIUM 324 MG
324 TABLET ORAL ONCE
Refills: 0 | Status: COMPLETED | OUTPATIENT
Start: 2021-06-18 | End: 2021-06-18

## 2021-06-18 RX ORDER — DILTIAZEM HCL 120 MG
180 CAPSULE, EXT RELEASE 24 HR ORAL DAILY
Refills: 0 | Status: DISCONTINUED | OUTPATIENT
Start: 2021-06-18 | End: 2021-06-20

## 2021-06-18 RX ADMIN — Medication 324 MILLIGRAM(S): at 19:53

## 2021-06-18 RX ADMIN — SODIUM CHLORIDE 1000 MILLILITER(S): 9 INJECTION INTRAMUSCULAR; INTRAVENOUS; SUBCUTANEOUS at 19:11

## 2021-06-18 NOTE — H&P ADULT - PROBLEM SELECTOR PLAN 1
-pt p/w weakness with concern for syncope  -Generalized Weakness with Unwitnessed Syncope possibly due to Atrial Fibrillation:  -EGSYS Score= 3 points (Syncope during effort), Cardiac syncope likely (95% sensitivity), 17% mortality at 21-24 months for EGSYS =3  -CXR negative  -CT Head showed  ventricles and sulci are prominent, compatible with age-related generalized cerebral volume loss, no hemorrhage  -Rehab Consult  -Fall Precautions, Aspiration Precautions  -f/u echocardiogram

## 2021-06-18 NOTE — H&P ADULT - PROBLEM SELECTOR PLAN 2
-EKG showed Afib with RVR , QTC prolonged 526  -c/w home meds multaq and cardizem  -monitor on tele  -f/u echo  -trend troponins   -Cardio consulted Dr Olivier

## 2021-06-18 NOTE — H&P ADULT - PROBLEM SELECTOR PLAN 3
-HEART Score= 4 points Moderate Score, Risk of MACE of 12-16.6%.  -Troponin= 0.065   -Pro-BNP= 2,784  -TTE (6/7/2020) identified LVEF= 55-60%, Mitral annular calcification, Mild mitral regurgitation, Moderately dilated left atrium, RV systolic pressure is mildly increased at  44 mm Hg, Mild tricuspid regurgitation, Mild pulmonic regurgitation.  -trend troponins  -f/u echocardiogram

## 2021-06-18 NOTE — H&P ADULT - ATTENDING COMMENTS
Patient is an 81 year old female with a PMH of HTN, HLD, Permanent Atrial Fibrillation (on Diltiazem and Multaq, Not on AC) who was BIBEMS due to generalized weakness.  Patient is awake and alert, though a fair-to-poor historian.  Therefore a significant amount of information was obtained from SIDNEY (Nae Mariano, Daughter, [phone]) at bedside.  Patient and NOK have reportedly been in [place] for the past several days/week(s) and although patient has been compliant with her prescribed medications, her Multaq ran out 3 days prior to current presentation.  However, upon returning home to [place] from [place], patient again resumed taking her refilled Multaq as prescribed.    On the day of current presentation at approximately 10:30AM, NOK reports that patient "wobbled" to the bathroom (reportedly normal for patient), though only after several hours of not hearing from patient, NOK went to check on her and found patient on the bathroom floor.    At time of examination in the ED, patient denies any headache, dizziness, chest pain, palpitations, shortness of breath, abdominal pain, nausea/vomiting/diarrhea.    T(C): 36.8 (06-18-21 @ 15:40), Max: 36.8 (06-18-21 @ 15:40)  T(F): 98.3 (06-18-21 @ 15:40), Max: 98.3 (06-18-21 @ 15:40)  HR: 99 (06-18-21 @ 15:40) (99 - 99)  BP: 156/107 (06-18-21 @ 15:40) (156/107 - 156/107)  RR: 16 (06-18-21 @ 15:40) (16 - 16)  SpO2: 98% (06-18-21 @ 15:40) (98% - 98%)  Wt(kg): --    P/E: As above MAR    A/P:    Generalized Weakness with ?Unwitnessed Syncope possibly due to Atrial Fibrillation:  -EGSYS Score= 3 points (Syncope during effort), Cardiac syncope likely (95% sensitivity), 17% mortality at 21-24 months for EGSYS =3  -Chest film, though portable, appears to demonstrate no obvious density suggestive of a bacterial pneumonia as well as seeming appreciably enlarged right heart border  -CT Head identified ventricles and sulci are prominent, compatible with age-related generalized cerebral volume loss, no CT evidence for acute cerebral cortical infarct. There is no evidence of hemorrhage. There is periventricular and subcortical white matter hypoattenuation,  most compatible with chronic microvascular ischemic changes.  -Will admit to Telemetry for continuous cardiac monitoring  -Will resume patient's home medications: Multaq 400mg PO BID and Diltiazem CD 180mg PO qd  -TTE (with attention to mPAP and LVEF)  -Fluid Restrictions, Daily Weights, Strict I&Os, Low Sodium diet as tolerated  -Will certainly need to ask Cardiology to evaluate patient for further recommendations  -Rehab Consult  -Fall Precautions, Aspiration Precautions    Permanent Atrial Fibrillation:  -As above    Troponinemia likely due to Type 2 MI:  -Troponin= 0.065 (Baseline= 0.047 on 6/7/2020)  -Pro-BNP= 2,784 (No baseline available)  -TTE (6/7/2020) identified LVEF= 55-60%, Mitral annular calcification, Mild mitral regurgitation, Moderately dilated left atrium, RV systolic pressure is mildly increased at  44 mm Hg, Mild tricuspid regurgitation, Mild pulmonic regurgitation.  -Will continue to trend Troponin with simultaneous acquisition of EKG  -TTE (with attention to mPAP and LVEF)  -Will ask Cardiology to evaluate patient for further recommendations    Chronic Renal Insufficiency:  -Cr= 1.51 (Baseline= 1.45 on 6/12/2020)  -Will send UA and Urinary Electrolytes (Sodium, Potassium, Creatinine, Chloride)  -Will continue with very gentle IVF hydration  -If no demonstrated improvement, may need to consider obtaining Bilateral Renal Sonogram, though will hold for now    Hyperbilirubinemia:  -Will send Bilirubin Total, Direct and Indirect    GI/DVT PPx:  -Heparin  -Pepcid Patient is an 81 year old female with a PMH of HTN, HLD, Permanent Atrial Fibrillation (on Diltiazem and Multaq, Not on AC) who was BIBEMS due to generalized weakness.  Patient is awake and alert, though a fair-to-poor historian.  Therefore a significant amount of information was obtained from SIDNEY (Nae Mariano, Daughter, [phone]) at bedside.  Patient and NOK have reportedly been in [place] for the past several days/week(s) and although patient has been compliant with her prescribed medications, her Multaq ran out 3 days prior to current presentation.  However, upon returning home to [place] from [place], patient again resumed taking her refilled Multaq as prescribed.    On the day of current presentation at approximately 10:30AM, NOK reports that patient "wobbled" to the bathroom (reportedly normal for patient), though only after several hours of not hearing from patient, NOK went to check on her and found patient on the bathroom floor.    At time of examination in the ED, patient denies any headache, dizziness, chest pain, palpitations, shortness of breath, abdominal pain, nausea/vomiting/diarrhea.    T(C): 36.8 (06-18-21 @ 15:40), Max: 36.8 (06-18-21 @ 15:40)  T(F): 98.3 (06-18-21 @ 15:40), Max: 98.3 (06-18-21 @ 15:40)  HR: 99 (06-18-21 @ 15:40) (99 - 99)  BP: 156/107 (06-18-21 @ 15:40) (156/107 - 156/107)  RR: 16 (06-18-21 @ 15:40) (16 - 16)  SpO2: 98% (06-18-21 @ 15:40) (98% - 98%)  Wt(kg): --    P/E: As above MAR    A/P:    Generalized Weakness with ?Unwitnessed Syncope possibly due to Atrial Fibrillation:  -EGSYS Score= 3 points (Syncope during effort), Cardiac syncope likely (95% sensitivity), 17% mortality at 21-24 months for EGSYS =3  -Chest film, though portable, appears to demonstrate no obvious density suggestive of a bacterial pneumonia as well as seeming appreciably enlarged right heart border  -CT Head identified ventricles and sulci are prominent, compatible with age-related generalized cerebral volume loss, no CT evidence for acute cerebral cortical infarct. There is no evidence of hemorrhage. There is periventricular and subcortical white matter hypoattenuation,  most compatible with chronic microvascular ischemic changes.  -Will admit to Telemetry for continuous cardiac monitoring  -Will resume patient's home medications: Multaq 400mg PO BID and Diltiazem CD 180mg PO qd  -TTE (with attention to mPAP and LVEF)  -Fluid Restrictions, Daily Weights, Strict I&Os, Low Sodium diet as tolerated  -Will certainly need to ask Cardiology to evaluate patient for further recommendations  -Rehab Consult  -Fall Precautions, Aspiration Precautions    Permanent Atrial Fibrillation:  -As above    Troponinemia likely due to Type 2 MI:  -Troponin= 0.065 (Baseline= 0.047 on 6/7/2020)  -Pro-BNP= 2,784 (No baseline available)  -TTE (6/7/2020) identified LVEF= 55-60%, Mitral annular calcification, Mild mitral regurgitation, Moderately dilated left atrium, RV systolic pressure is mildly increased at  44 mm Hg, Mild tricuspid regurgitation, Mild pulmonic regurgitation.  -Will continue to trend Troponin with simultaneous acquisition of EKG  -TTE (with attention to mPAP and LVEF)  -Will ask Cardiology to evaluate patient for further recommendations    Chronic Renal Insufficiency:  -Cr= 1.51 (Baseline= 1.45 on 6/12/2020)  -Will send UA and Urinary Electrolytes (Sodium, Potassium, Creatinine, Chloride)  -Will continue with very gentle IVF hydration  -If no demonstrated improvement, may need to consider obtaining Bilateral Renal Sonogram, though will hold for now    Hyperbilirubinemia:  -Will send Bilirubin Total, Direct and Indirect    HTN:  -Will resume patient's home medications  -Vital Signs Q4H    HLD:  -Lipid Panel with AM labs  -Will resume patient's home medications    GI/DVT PPx:  -Heparin  -Pepcid Patient is an 81 year old female with a PMH of HTN, HLD, Permanent Atrial Fibrillation (on Diltiazem and Multaq, Not on AC) who was BIBEMS due to generalized weakness.  Patient is awake and alert, though a fair-to-poor historian.  Therefore a significant amount of information was obtained from SIDNEY (Nae Mariano, Daughter, [phone]) at bedside.  Patient and NOK have reportedly been in [place] for the past several days/week(s) and although patient has been compliant with her prescribed medications, her Multaq ran out 3 days prior to current presentation.  However, upon returning home to [place] from [place], patient again resumed taking her refilled Multaq as prescribed.    On the day of current presentation at approximately 10:30AM, NOK reports that patient "wobbled" to the bathroom (reportedly normal for patient), though only after several hours of not hearing from patient, NOK went to check on her and found patient on the bathroom floor.    At time of examination in the ED, patient denies any headache, dizziness, chest pain, palpitations, shortness of breath, abdominal pain, nausea/vomiting/diarrhea.    T(C): 36.8 (06-18-21 @ 15:40), Max: 36.8 (06-18-21 @ 15:40)  T(F): 98.3 (06-18-21 @ 15:40), Max: 98.3 (06-18-21 @ 15:40)  HR: 99 (06-18-21 @ 15:40) (99 - 99)  BP: 156/107 (06-18-21 @ 15:40) (156/107 - 156/107)  RR: 16 (06-18-21 @ 15:40) (16 - 16)  SpO2: 98% (06-18-21 @ 15:40) (98% - 98%)  Wt(kg): --    P/E: As above MAR    A/P:    Generalized Weakness with ?Unwitnessed Syncope possibly due to Atrial Fibrillation:  -EGSYS Score= 3 points (Syncope during effort), Cardiac syncope likely (95% sensitivity), 17% mortality at 21-24 months for EGSYS =3  -Chest film, though portable, appears to demonstrate no obvious density suggestive of a bacterial pneumonia as well as seeming appreciably enlarged right heart border  -CT Head identified ventricles and sulci are prominent, compatible with age-related generalized cerebral volume loss, no CT evidence for acute cerebral cortical infarct. There is no evidence of hemorrhage. There is periventricular and subcortical white matter hypoattenuation,  most compatible with chronic microvascular ischemic changes.  -Will admit to Telemetry for continuous cardiac monitoring  -Will resume patient's home medications: Multaq 400mg PO BID and Diltiazem CD 180mg PO qd  -TTE (with attention to mPAP and LVEF)  -Fluid Restrictions, Daily Weights, Strict I&Os, Low Sodium diet as tolerated  -Will certainly need to ask Cardiology to evaluate patient for further recommendations  -Rehab Consult  -Fall Precautions, Aspiration Precautions    Permanent Atrial Fibrillation:  -As above    Troponinemia likely due to Type 2 MI:  -HEART Score= 4 points (Non-specific repolarization disturbance, Age >=65, 1-2 Risk Factors), Moderate Score, Risk of MACE of 12-16.6%.  -Troponin= 0.065 (Baseline= 0.047 on 6/7/2020)  -Pro-BNP= 2,784 (No baseline available)  -TTE (6/7/2020) identified LVEF= 55-60%, Mitral annular calcification, Mild mitral regurgitation, Moderately dilated left atrium, RV systolic pressure is mildly increased at  44 mm Hg, Mild tricuspid regurgitation, Mild pulmonic regurgitation.  -Will continue to trend Troponin with simultaneous acquisition of EKG  -TTE (with attention to mPAP and LVEF)  -Will ask Cardiology to evaluate patient for further recommendations    Chronic Renal Insufficiency:  -Cr= 1.51 (Baseline= 1.45 on 6/12/2020)  -Will send UA and Urinary Electrolytes (Sodium, Potassium, Creatinine, Chloride)  -Will continue with very gentle IVF hydration  -If no demonstrated improvement, may need to consider obtaining Bilateral Renal Sonogram, though will hold for now    Hyperbilirubinemia:  -Will send Bilirubin Total, Direct and Indirect    HTN:  -Will resume patient's home medications  -Vital Signs Q4H    HLD:  -Lipid Panel with AM labs  -Will resume patient's home medications    GI/DVT PPx:  -Heparin  -Pepcid

## 2021-06-18 NOTE — ED PROVIDER NOTE - NEUROLOGICAL, MLM
Alert and oriented, no focal deficits, no motor or sensory deficits, normal speech, no facial droop, normal coordination

## 2021-06-18 NOTE — ED ADULT NURSE NOTE - NEURO ASSESSMENT
- - - normal sinus rhythm, Normal axis, Normal VT interval and QRS complex. There are no acute ischemic ST or T-wave changes.

## 2021-06-18 NOTE — ED ADULT NURSE NOTE - OBJECTIVE STATEMENT
Patient presents to ED with c/o weakness, as per daughter patient was found on bathroom floor. Patient is unable to tell what happened, at present time is Alert & oriented to person and place. No bruising, bleeding noted. Patient denies pain/discomfort, sitting comfortably on stretcher.

## 2021-06-18 NOTE — ED PROVIDER NOTE - OBJECTIVE STATEMENT
80 y/o woman, h/o HTN, A-fib, on Multaq, c/o generalized weakness x 2 days,  today found sitting on bathroom floor by daughter, unclear whether Pt had syncope/LOC/injury, but she does c/o headache since then.  No focal weakness/numbness/fever/SOB/CP/N/V/dizziness/speech change.  Pt was traveling with family to [place] recently and ran out of Multaq few days ago, then obtained it and resumed taking it since last night.  She is not taking any anticoagulation/antiplatelets due to risk for bleeding/falls.  -PMD Dr. George (not from [place]).

## 2021-06-18 NOTE — H&P ADULT - NSICDXPILOT_GEN_ALL_CORE
[place] [Alert] : alert [No Acute Distress] : no acute distress [Normal Sclera/Conjunctiva] : normal sclera/conjunctiva [EOMI] : extra ocular movement intact [No LAD] : no lymphadenopathy [Thyroid Not Enlarged] : the thyroid was not enlarged [Supple] : the neck was supple [No Accessory Muscle Use] : no accessory muscle use [Well Healed Scar] : well healed scar [Normal S1, S2] : normal S1 and S2 [Clear to Auscultation] : lungs were clear to auscultation bilaterally [Regular Rhythm] : with a regular rhythm [No Edema] : there was no peripheral edema [Not Tender] : non-tender [Normal Bowel Sounds] : normal bowel sounds [Not Distended] : not distended [Soft] : abdomen soft [Kyphosis] : kyphosis present [Normal] : normal and non tender [No Clubbing, Cyanosis] : no clubbing  or cyanosis of the fingernails [No Rash] : no rash [Normal Reflexes] : deep tendon reflexes were 2+ and symmetric [Normal Mood] : the mood was normal [Normal Affect] : the affect was normal [de-identified] : 2/6 DEMETRIUS

## 2021-06-18 NOTE — ED PROVIDER NOTE - CARE PLAN
Principal Discharge DX:	Non-ST elevation myocardial infarction (NSTEMI)  Secondary Diagnosis:	Syncope, unspecified syncope type

## 2021-06-18 NOTE — H&P ADULT - PROBLEM SELECTOR PLAN 6
[ ] Previous VTE                                    3  [ ] Thrombophilia                                  2  [ ] Lower limb paralysis                        2  (unable to hold up >15 seconds)    [ ] Current Cancer (within 6 months)        2   [x] Immobilization > 24 hrs                    1  [ ] ICU/CCU stay > 24 hrs                      1  [x] Age > 60                                         1   SQH for prophylaxis  protonix

## 2021-06-18 NOTE — H&P ADULT - ASSESSMENT
Patient, an 81 year old female with a PMH of short-term memory loss, HTN, HLD, Permanent Atrial Fibrillation (on Diltiazem and Multaq, Not on AC) who was BIBEMS due to generalized weakness. Admitted for Afib with RVR and concern for syncope.

## 2021-06-18 NOTE — H&P ADULT - NSHPPHYSICALEXAM_GEN_ALL_CORE
PHYSICAL EXAM:  GENERAL: NAD, lying in bed comfortably  HEAD:  Atraumatic, Normocephalic  EYES: EOMI, PERRLA, conjunctiva and sclera clear  ENT: Moist mucous membranes  NECK: Supple, No JVD  CHEST/LUNG: Clear to auscultation bilaterally; No rales, rhonchi, wheezing, or rubs. Unlabored respirations  HEART: Regular rate and rhythm; No murmurs, rubs, or gallops  ABDOMEN: Bowel sounds present; Soft, Nontender, Nondistended.   EXTREMITIES:  No clubbing, cyanosis, or edema  NERVOUS SYSTEM:  Alert & Oriented X2,   MSK: FROM all 4 extremities, full and equal strength  SKIN: No rashes or lesions

## 2021-06-18 NOTE — H&P ADULT - PROBLEM SELECTOR PLAN 4
-Cr= 1.51 (Baseline= 1.45 on 6/12/2020)  - continue with very gentle IVF hydration  - follow UA and urine lytes   -f/u BMP in am

## 2021-06-18 NOTE — ED PROVIDER NOTE - CLINICAL SUMMARY MEDICAL DECISION MAKING FREE TEXT BOX
82 y/o woman, h/o HTN, A-fib, on Multaq, c/o generalized weakness x 2 days,  today found sitting on bathroom floor by daughter, unclear whether Pt had syncope/LOC/injury, but she does c/o headache since then--CT head, labs, EKG, CXR, IVF, anticipate admission.

## 2021-06-19 ENCOUNTER — TRANSCRIPTION ENCOUNTER (OUTPATIENT)
Age: 81
End: 2021-06-19

## 2021-06-19 DIAGNOSIS — I10 ESSENTIAL (PRIMARY) HYPERTENSION: ICD-10-CM

## 2021-06-19 DIAGNOSIS — Z87.81 PERSONAL HISTORY OF (HEALED) TRAUMATIC FRACTURE: Chronic | ICD-10-CM

## 2021-06-19 DIAGNOSIS — N17.9 ACUTE KIDNEY FAILURE, UNSPECIFIED: ICD-10-CM

## 2021-06-19 DIAGNOSIS — I48.91 UNSPECIFIED ATRIAL FIBRILLATION: ICD-10-CM

## 2021-06-19 DIAGNOSIS — R77.8 OTHER SPECIFIED ABNORMALITIES OF PLASMA PROTEINS: ICD-10-CM

## 2021-06-19 DIAGNOSIS — Z29.9 ENCOUNTER FOR PROPHYLACTIC MEASURES, UNSPECIFIED: ICD-10-CM

## 2021-06-19 LAB
A1C WITH ESTIMATED AVERAGE GLUCOSE RESULT: 5.9 % — HIGH (ref 4–5.6)
ALBUMIN SERPL ELPH-MCNC: 3 G/DL — LOW (ref 3.5–5)
ALP SERPL-CCNC: 55 U/L — SIGNIFICANT CHANGE UP (ref 40–120)
ALT FLD-CCNC: 21 U/L DA — SIGNIFICANT CHANGE UP (ref 10–60)
ANION GAP SERPL CALC-SCNC: 10 MMOL/L — SIGNIFICANT CHANGE UP (ref 5–17)
AST SERPL-CCNC: 21 U/L — SIGNIFICANT CHANGE UP (ref 10–40)
BASOPHILS # BLD AUTO: 0.03 K/UL — SIGNIFICANT CHANGE UP (ref 0–0.2)
BASOPHILS NFR BLD AUTO: 0.7 % — SIGNIFICANT CHANGE UP (ref 0–2)
BILIRUB DIRECT SERPL-MCNC: 0.3 MG/DL — HIGH (ref 0–0.2)
BILIRUB INDIRECT FLD-MCNC: 0.6 MG/DL — SIGNIFICANT CHANGE UP (ref 0.2–1)
BILIRUB SERPL-MCNC: 0.9 MG/DL — SIGNIFICANT CHANGE UP (ref 0.2–1.2)
BILIRUB SERPL-MCNC: 0.9 MG/DL — SIGNIFICANT CHANGE UP (ref 0.2–1.2)
BUN SERPL-MCNC: 20 MG/DL — HIGH (ref 7–18)
CALCIUM SERPL-MCNC: 8.3 MG/DL — LOW (ref 8.4–10.5)
CHLORIDE SERPL-SCNC: 110 MMOL/L — HIGH (ref 96–108)
CHOLEST SERPL-MCNC: 110 MG/DL — SIGNIFICANT CHANGE UP
CK MB CFR SERPL CALC: 2.8 NG/ML — SIGNIFICANT CHANGE UP (ref 0–3.6)
CK SERPL-CCNC: 256 U/L — HIGH (ref 21–215)
CO2 SERPL-SCNC: 22 MMOL/L — SIGNIFICANT CHANGE UP (ref 22–31)
COVID-19 SPIKE DOMAIN AB INTERP: POSITIVE
COVID-19 SPIKE DOMAIN ANTIBODY RESULT: >250 U/ML — HIGH
CREAT SERPL-MCNC: 1.3 MG/DL — SIGNIFICANT CHANGE UP (ref 0.5–1.3)
EOSINOPHIL # BLD AUTO: 0.16 K/UL — SIGNIFICANT CHANGE UP (ref 0–0.5)
EOSINOPHIL NFR BLD AUTO: 3.5 % — SIGNIFICANT CHANGE UP (ref 0–6)
ESTIMATED AVERAGE GLUCOSE: 123 MG/DL — HIGH (ref 68–114)
FOLATE SERPL-MCNC: 10.2 NG/ML — SIGNIFICANT CHANGE UP
GLUCOSE BLDC GLUCOMTR-MCNC: 126 MG/DL — HIGH (ref 70–99)
GLUCOSE SERPL-MCNC: 98 MG/DL — SIGNIFICANT CHANGE UP (ref 70–99)
HCT VFR BLD CALC: 41.2 % — SIGNIFICANT CHANGE UP (ref 34.5–45)
HDLC SERPL-MCNC: 48 MG/DL — LOW
HGB BLD-MCNC: 13.5 G/DL — SIGNIFICANT CHANGE UP (ref 11.5–15.5)
IMM GRANULOCYTES NFR BLD AUTO: 0.2 % — SIGNIFICANT CHANGE UP (ref 0–1.5)
LIPID PNL WITH DIRECT LDL SERPL: 48 MG/DL — SIGNIFICANT CHANGE UP
LYMPHOCYTES # BLD AUTO: 1.77 K/UL — SIGNIFICANT CHANGE UP (ref 1–3.3)
LYMPHOCYTES # BLD AUTO: 38.7 % — SIGNIFICANT CHANGE UP (ref 13–44)
MAGNESIUM SERPL-MCNC: 2.1 MG/DL — SIGNIFICANT CHANGE UP (ref 1.6–2.6)
MCHC RBC-ENTMCNC: 31.4 PG — SIGNIFICANT CHANGE UP (ref 27–34)
MCHC RBC-ENTMCNC: 32.8 GM/DL — SIGNIFICANT CHANGE UP (ref 32–36)
MCV RBC AUTO: 95.8 FL — SIGNIFICANT CHANGE UP (ref 80–100)
MONOCYTES # BLD AUTO: 0.47 K/UL — SIGNIFICANT CHANGE UP (ref 0–0.9)
MONOCYTES NFR BLD AUTO: 10.3 % — SIGNIFICANT CHANGE UP (ref 2–14)
NEUTROPHILS # BLD AUTO: 2.13 K/UL — SIGNIFICANT CHANGE UP (ref 1.8–7.4)
NEUTROPHILS NFR BLD AUTO: 46.6 % — SIGNIFICANT CHANGE UP (ref 43–77)
NON HDL CHOLESTEROL: 62 MG/DL — SIGNIFICANT CHANGE UP
NRBC # BLD: 0 /100 WBCS — SIGNIFICANT CHANGE UP (ref 0–0)
PHOSPHATE SERPL-MCNC: 3.3 MG/DL — SIGNIFICANT CHANGE UP (ref 2.5–4.5)
PLATELET # BLD AUTO: 139 K/UL — LOW (ref 150–400)
POTASSIUM SERPL-MCNC: 3.7 MMOL/L — SIGNIFICANT CHANGE UP (ref 3.5–5.3)
POTASSIUM SERPL-SCNC: 3.7 MMOL/L — SIGNIFICANT CHANGE UP (ref 3.5–5.3)
PROT SERPL-MCNC: 7 G/DL — SIGNIFICANT CHANGE UP (ref 6–8.3)
RBC # BLD: 4.3 M/UL — SIGNIFICANT CHANGE UP (ref 3.8–5.2)
RBC # FLD: 15.6 % — HIGH (ref 10.3–14.5)
SARS-COV-2 IGG+IGM SERPL QL IA: >250 U/ML — HIGH
SARS-COV-2 IGG+IGM SERPL QL IA: POSITIVE
SODIUM SERPL-SCNC: 142 MMOL/L — SIGNIFICANT CHANGE UP (ref 135–145)
TRIGL SERPL-MCNC: 70 MG/DL — SIGNIFICANT CHANGE UP
TROPONIN I SERPL-MCNC: 0.06 NG/ML — HIGH (ref 0–0.04)
TROPONIN I SERPL-MCNC: 0.06 NG/ML — HIGH (ref 0–0.04)
TSH SERPL-MCNC: 1.24 UU/ML — SIGNIFICANT CHANGE UP (ref 0.34–4.82)
VIT B12 SERPL-MCNC: 622 PG/ML — SIGNIFICANT CHANGE UP (ref 232–1245)
WBC # BLD: 4.57 K/UL — SIGNIFICANT CHANGE UP (ref 3.8–10.5)
WBC # FLD AUTO: 4.57 K/UL — SIGNIFICANT CHANGE UP (ref 3.8–10.5)

## 2021-06-19 PROCEDURE — 99233 SBSQ HOSP IP/OBS HIGH 50: CPT | Mod: GC

## 2021-06-19 RX ORDER — DILTIAZEM HCL 120 MG
60 CAPSULE, EXT RELEASE 24 HR ORAL EVERY 6 HOURS
Refills: 0 | Status: DISCONTINUED | OUTPATIENT
Start: 2021-06-20 | End: 2021-06-20

## 2021-06-19 RX ORDER — PANTOPRAZOLE SODIUM 20 MG/1
40 TABLET, DELAYED RELEASE ORAL
Refills: 0 | Status: DISCONTINUED | OUTPATIENT
Start: 2021-06-19 | End: 2021-06-22

## 2021-06-19 RX ORDER — ASPIRIN/CALCIUM CARB/MAGNESIUM 324 MG
1 TABLET ORAL
Qty: 0 | Refills: 0 | DISCHARGE

## 2021-06-19 RX ORDER — DRONEDARONE 400 MG/1
1 TABLET, FILM COATED ORAL
Qty: 0 | Refills: 0 | DISCHARGE

## 2021-06-19 RX ORDER — ATORVASTATIN CALCIUM 80 MG/1
1 TABLET, FILM COATED ORAL
Qty: 0 | Refills: 0 | DISCHARGE

## 2021-06-19 RX ORDER — PANTOPRAZOLE SODIUM 20 MG/1
1 TABLET, DELAYED RELEASE ORAL
Qty: 0 | Refills: 0 | DISCHARGE

## 2021-06-19 RX ADMIN — HEPARIN SODIUM 5000 UNIT(S): 5000 INJECTION INTRAVENOUS; SUBCUTANEOUS at 05:15

## 2021-06-19 RX ADMIN — PANTOPRAZOLE SODIUM 40 MILLIGRAM(S): 20 TABLET, DELAYED RELEASE ORAL at 06:21

## 2021-06-19 RX ADMIN — DRONEDARONE 400 MILLIGRAM(S): 400 TABLET, FILM COATED ORAL at 17:04

## 2021-06-19 RX ADMIN — Medication 180 MILLIGRAM(S): at 05:15

## 2021-06-19 RX ADMIN — DRONEDARONE 400 MILLIGRAM(S): 400 TABLET, FILM COATED ORAL at 05:16

## 2021-06-19 RX ADMIN — Medication 81 MILLIGRAM(S): at 12:03

## 2021-06-19 RX ADMIN — HEPARIN SODIUM 5000 UNIT(S): 5000 INJECTION INTRAVENOUS; SUBCUTANEOUS at 14:00

## 2021-06-19 NOTE — PROGRESS NOTE ADULT - PROBLEM SELECTOR PLAN 3
-HEART Score= 4 points Moderate Score, Risk of MACE of 12-16.6%.  -Troponin= 0.065   -Pro-BNP= 2,784  -TTE (6/7/2020) identified LVEF= 55-60%, Mitral annular calcification, Mild mitral regurgitation, Moderately dilated left atrium, RV systolic pressure is mildly increased at  44 mm Hg, Mild tricuspid regurgitation, Mild pulmonic regurgitation.  -trend troponins  -f/u echocardiogram -HEART Score= 4 points Moderate Score, Risk of MACE of 12-16.6%.  -Troponin= 0.065 on admission , trended down   -Pro-BNP= 2,784  -TTE (6/7/2020) identified LVEF= 55-60%, Mitral annular calcification, Mild mitral regurgitation, Moderately dilated left atrium, RV systolic pressure is mildly increased at  44 mm Hg, Mild tricuspid regurgitation, Mild pulmonic regurgitation.  -troponins trended down   -f/u echocardiogram

## 2021-06-19 NOTE — CONSULT NOTE ADULT - TIME BILLING
- Review of records, telemetry, vital signs and daily labs.   - General and cardiovascular physical examination.  - Generation of cardiovascular treatment plan.  - Coordination of care.      Patient was seen and examined by me on 06/19/2021,interim events noted,labs and radiology studies reviewed.  Gama Lopez MD,FACC.  [address] [place], [place] [id number].  [phone]

## 2021-06-19 NOTE — PHYSICAL THERAPY INITIAL EVALUATION ADULT - CRITERIA FOR SKILLED THERAPEUTIC INTERVENTIONS
anticipated discharge recommendation impairments found/rehab potential/therapy frequency/anticipated discharge recommendation

## 2021-06-19 NOTE — PROGRESS NOTE ADULT - SUBJECTIVE AND OBJECTIVE BOX
PGY-1 Progress Note discussed with attending    PAGER #: [[phone]] TILL 5:00 PM  PLEASE CONTACT ON CALL TEAM:  - On Call Team (Please refer to Majo) FROM 5:00 PM - 8:30PM  - Nightfloat Team FROM 8:30 -7:30 AM    CHIEF COMPLAINT & BRIEF HOSPITAL COURSE:    INTERVAL HPI/OVERNIGHT EVENTS:       REVIEW OF SYSTEMS:  CONSTITUTIONAL: No fever, weight loss, or fatigue  RESPIRATORY: No cough, wheezing, chills or hemoptysis; No shortness of breath  CARDIOVASCULAR: No chest pain, palpitations, dizziness, or leg swelling  GASTROINTESTINAL: No abdominal pain. No nausea, vomiting, or hematemesis; No diarrhea or constipation. No melena or hematochezia.  GENITOURINARY: No dysuria or hematuria, urinary frequency  NEUROLOGICAL: No headaches, memory loss, loss of strength, numbness, or tremors  SKIN: No itching, burning, rashes, or lesions     MEDICATIONS  (STANDING):  aspirin  chewable 81 milliGRAM(s) Oral daily  atorvastatin 40 milliGRAM(s) Oral at bedtime  diltiazem    milliGRAM(s) Oral daily  dronedarone 400 milliGRAM(s) Oral two times a day  heparin   Injectable 5000 Unit(s) SubCutaneous every 8 hours  pantoprazole    Tablet 40 milliGRAM(s) Oral before breakfast    MEDICATIONS  (PRN):      Vital Signs Last 24 Hrs  T(C): 36.6 (19 Jun 2021 07:19), Max: 36.8 (18 Jun 2021 15:40)  T(F): 97.9 (19 Jun 2021 07:19), Max: 98.3 (18 Jun 2021 15:40)  HR: 78 (19 Jun 2021 07:19) (78 - 106)  BP: 143/86 (19 Jun 2021 07:19) (139/- - 156/107)  BP(mean): 87 (19 Jun 2021 04:41) (87 - 87)  RR: 19 (19 Jun 2021 07:19) (16 - 19)  SpO2: 99% (19 Jun 2021 07:19) (97% - 100%)    PHYSICAL EXAMINATION:  GENERAL: NAD, well built  HEAD:  Atraumatic, Normocephalic  EYES:  conjunctiva and sclera clear  NECK: Supple, No JVD, Normal thyroid  CHEST/LUNG: Clear to auscultation. Clear to percussion bilaterally; No rales, rhonchi, wheezing, or rubs  HEART: Regular rate and rhythm; No murmurs, rubs, or gallops  ABDOMEN: Soft, Nontender, Nondistended; Bowel sounds present  NERVOUS SYSTEM:  Alert & Oriented X3,    EXTREMITIES:  2+ Peripheral Pulses, No clubbing, cyanosis, or edema  SKIN: warm dry                          13.5   4.57  )-----------( 139      ( 19 Jun 2021 06:32 )             41.2     06-19    142  |  110<H>  |  20<H>  ----------------------------<  98  3.7   |  22  |  1.30    Ca    8.3<L>      19 Jun 2021 06:32  Phos  3.3     06-19  Mg     2.1     06-19    TPro  7.0  /  Alb  3.0<L>  /  TBili  0.9  /  DBili  0.3<H>  /  AST  21  /  ALT  21  /  AlkPhos  55  06-19    LIVER FUNCTIONS - ( 19 Jun 2021 06:32 )  Alb: 3.0 g/dL / Pro: 7.0 g/dL / ALK PHOS: 55 U/L / ALT: 21 U/L DA / AST: 21 U/L / GGT: x           CARDIAC MARKERS ( 19 Jun 2021 06:32 )  0.062 ng/mL / x     / x     / x     / x      CARDIAC MARKERS ( 19 Jun 2021 01:19 )  0.063 ng/mL / x     / 256 U/L / x     / 2.8 ng/mL  CARDIAC MARKERS ( 18 Jun 2021 19:29 )  x     / x     / 238 U/L / x     / x      CARDIAC MARKERS ( 18 Jun 2021 17:05 )  0.065 ng/mL / x     / x     / x     / x                  CAPILLARY BLOOD GLUCOSE  CAPILLARY BLOOD GLUCOSE        CAPILLARY BLOOD GLUCOSE          RADIOLOGY & ADDITIONAL TESTS:                   PGY-1 Progress Note discussed with attending    PAGER #: [[phone]] TILL 5:00 PM  PLEASE CONTACT ON CALL TEAM:  - On Call Team (Please refer to Majo) FROM 5:00 PM - 8:30PM  - Nightfloat Team FROM 8:30 -7:30 AM      INTERVAL HPI/OVERNIGHT EVENTS:   patient examined bed side, she is comfortable , NAD , AAO x 3 , but forgetting , doesnot remember the incident ,     REVIEW OF SYSTEMS:  CONSTITUTIONAL: No fever, weight loss, or fatigue  RESPIRATORY: No cough, wheezing, chills or hemoptysis; No shortness of breath  CARDIOVASCULAR: No chest pain, palpitations, dizziness, or leg swelling  GASTROINTESTINAL: No abdominal pain. No nausea, vomiting, or hematemesis; No diarrhea or constipation. No melena or hematochezia.  GENITOURINARY: No dysuria or hematuria, urinary frequency  NEUROLOGICAL: No headaches, memory loss, loss of strength, numbness, or tremors  SKIN: No itching, burning, rashes, or lesions     MEDICATIONS  (STANDING):  aspirin  chewable 81 milliGRAM(s) Oral daily  atorvastatin 40 milliGRAM(s) Oral at bedtime  diltiazem    milliGRAM(s) Oral daily  dronedarone 400 milliGRAM(s) Oral two times a day  heparin   Injectable 5000 Unit(s) SubCutaneous every 8 hours  pantoprazole    Tablet 40 milliGRAM(s) Oral before breakfast    MEDICATIONS  (PRN):      Vital Signs Last 24 Hrs  T(C): 36.6 (19 Jun 2021 07:19), Max: 36.8 (18 Jun 2021 15:40)  T(F): 97.9 (19 Jun 2021 07:19), Max: 98.3 (18 Jun 2021 15:40)  HR: 78 (19 Jun 2021 07:19) (78 - 106)  BP: 143/86 (19 Jun 2021 07:19) (139/- - 156/107)  BP(mean): 87 (19 Jun 2021 04:41) (87 - 87)  RR: 19 (19 Jun 2021 07:19) (16 - 19)  SpO2: 99% (19 Jun 2021 07:19) (97% - 100%)    PHYSICAL EXAMINATION:  GENERAL: NAD,  HEAD:  Atraumatic, Normocephalic  EYES:  conjunctiva and sclera clear  NECK: Supple, No JVD, Normal thyroid  CHEST/LUNG: Clear to auscultation. Clear to percussion bilaterally; No rales, rhonchi, wheezing, or rubs  HEART: Regular rate and rhythm; No murmurs, rubs, or gallops  ABDOMEN: Soft, Nontender, Nondistended; Bowel sounds present  NERVOUS SYSTEM:  Alert & Oriented X3 but forgetting   EXTREMITIES:  2+ Peripheral Pulses, No clubbing, cyanosis, or edema  SKIN: warm dry                          13.5   4.57  )-----------( 139      ( 19 Jun 2021 06:32 )             41.2     06-19    142  |  110<H>  |  20<H>  ----------------------------<  98  3.7   |  22  |  1.30    Ca    8.3<L>      19 Jun 2021 06:32  Phos  3.3     06-19  Mg     2.1     06-19    TPro  7.0  /  Alb  3.0<L>  /  TBili  0.9  /  DBili  0.3<H>  /  AST  21  /  ALT  21  /  AlkPhos  55  06-19    LIVER FUNCTIONS - ( 19 Jun 2021 06:32 )  Alb: 3.0 g/dL / Pro: 7.0 g/dL / ALK PHOS: 55 U/L / ALT: 21 U/L DA / AST: 21 U/L / GGT: x           CARDIAC MARKERS ( 19 Jun 2021 06:32 )  0.062 ng/mL / x     / x     / x     / x      CARDIAC MARKERS ( 19 Jun 2021 01:19 )  0.063 ng/mL / x     / 256 U/L / x     / 2.8 ng/mL  CARDIAC MARKERS ( 18 Jun 2021 19:29 )  x     / x     / 238 U/L / x     / x      CARDIAC MARKERS ( 18 Jun 2021 17:05 )  0.065 ng/mL / x     / x     / x     / x                  CAPILLARY BLOOD GLUCOSE  CAPILLARY BLOOD GLUCOSE        CAPILLARY BLOOD GLUCOSE          RADIOLOGY & ADDITIONAL TESTS:                   PGY-1 Progress Note discussed with attending    PAGER #: [[phone]] TILL 5:00 PM  PLEASE CONTACT ON CALL TEAM:  - On Call Team (Please refer to Majo) FROM 5:00 PM - 8:30PM  - Nightfloat Team FROM 8:30 -7:30 AM      INTERVAL HPI/OVERNIGHT EVENTS:   patient examined bed side, she is comfortable , NAD , AAO x 3 , but forgetting , doesnot remember the incident , heart rate not well controlled ,   spoke with the daughter and updated her , she is requesting  consult for HHA planning     REVIEW OF SYSTEMS:  CONSTITUTIONAL: No fever, weight loss, or fatigue  RESPIRATORY: No cough, wheezing, chills or hemoptysis; No shortness of breath  CARDIOVASCULAR: No chest pain, palpitations, dizziness, or leg swelling  GASTROINTESTINAL: No abdominal pain. No nausea, vomiting, or hematemesis; No diarrhea or constipation. No melena or hematochezia.  GENITOURINARY: No dysuria or hematuria, urinary frequency  NEUROLOGICAL: No headaches, memory loss, loss of strength, numbness, or tremors  SKIN: No itching, burning, rashes, or lesions     MEDICATIONS  (STANDING):  aspirin  chewable 81 milliGRAM(s) Oral daily  atorvastatin 40 milliGRAM(s) Oral at bedtime  diltiazem    milliGRAM(s) Oral daily  dronedarone 400 milliGRAM(s) Oral two times a day  heparin   Injectable 5000 Unit(s) SubCutaneous every 8 hours  pantoprazole    Tablet 40 milliGRAM(s) Oral before breakfast    MEDICATIONS  (PRN):      Vital Signs Last 24 Hrs  T(C): 36.6 (19 Jun 2021 07:19), Max: 36.8 (18 Jun 2021 15:40)  T(F): 97.9 (19 Jun 2021 07:19), Max: 98.3 (18 Jun 2021 15:40)  HR: 78 (19 Jun 2021 07:19) (78 - 106)  BP: 143/86 (19 Jun 2021 07:19) (139/- - 156/107)  BP(mean): 87 (19 Jun 2021 04:41) (87 - 87)  RR: 19 (19 Jun 2021 07:19) (16 - 19)  SpO2: 99% (19 Jun 2021 07:19) (97% - 100%)    PHYSICAL EXAMINATION:  GENERAL: NAD,  HEAD:  Atraumatic, Normocephalic  EYES:  conjunctiva and sclera clear  NECK: Supple, No JVD, Normal thyroid  CHEST/LUNG: Clear to auscultation. Clear to percussion bilaterally; No rales, rhonchi, wheezing, or rubs  HEART: Regular rate and rhythm; No murmurs, rubs, or gallops  ABDOMEN: Soft, Nontender, Nondistended; Bowel sounds present  NERVOUS SYSTEM:  Alert & Oriented X3 but forgetting   EXTREMITIES:  2+ Peripheral Pulses, No clubbing, cyanosis, or edema  SKIN: warm dry                          13.5   4.57  )-----------( 139      ( 19 Jun 2021 06:32 )             41.2     06-19    142  |  110<H>  |  20<H>  ----------------------------<  98  3.7   |  22  |  1.30    Ca    8.3<L>      19 Jun 2021 06:32  Phos  3.3     06-19  Mg     2.1     06-19    TPro  7.0  /  Alb  3.0<L>  /  TBili  0.9  /  DBili  0.3<H>  /  AST  21  /  ALT  21  /  AlkPhos  55  06-19    LIVER FUNCTIONS - ( 19 Jun 2021 06:32 )  Alb: 3.0 g/dL / Pro: 7.0 g/dL / ALK PHOS: 55 U/L / ALT: 21 U/L DA / AST: 21 U/L / GGT: x           CARDIAC MARKERS ( 19 Jun 2021 06:32 )  0.062 ng/mL / x     / x     / x     / x      CARDIAC MARKERS ( 19 Jun 2021 01:19 )  0.063 ng/mL / x     / 256 U/L / x     / 2.8 ng/mL  CARDIAC MARKERS ( 18 Jun 2021 19:29 )  x     / x     / 238 U/L / x     / x      CARDIAC MARKERS ( 18 Jun 2021 17:05 )  0.065 ng/mL / x     / x     / x     / x                  CAPILLARY BLOOD GLUCOSE  CAPILLARY BLOOD GLUCOSE        CAPILLARY BLOOD GLUCOSE          RADIOLOGY & ADDITIONAL TESTS:

## 2021-06-19 NOTE — PHYSICAL THERAPY INITIAL EVALUATION ADULT - LIVES WITH, PROFILE
Pt. reports living alone on a one floor apartment but her adult children live in the same building and frequently visit/help her./alone

## 2021-06-19 NOTE — PROGRESS NOTE ADULT - PROBLEM SELECTOR PLAN 4
-Cr= 1.51 (Baseline= 1.45 on 6/12/2020)  - continue with very gentle IVF hydration  - follow UA and urine lytes   -f/u BMP in am resolved

## 2021-06-19 NOTE — PROGRESS NOTE ADULT - PROBLEM SELECTOR PLAN 1
-pt p/w weakness with concern for syncope  -Generalized Weakness with Unwitnessed Syncope possibly due to Atrial Fibrillation:  -EGSYS Score= 3 points (Syncope during effort), Cardiac syncope likely (95% sensitivity), 17% mortality at 21-24 months for EGSYS =3  -CXR negative  -CT Head showed  ventricles and sulci are prominent, compatible with age-related generalized cerebral volume loss, no hemorrhage  -Rehab Consult  -Fall Precautions, Aspiration Precautions  -f/u echocardiogram -pt p/w weakness with concern for syncope  -Generalized Weakness with Unwitnessed Syncope possibly due to Atrial Fibrillation:  -EGSYS Score= 3 points (Syncope during effort), Cardiac syncope likely (95% sensitivity), 17% mortality at 21-24 months for EGSYS =3  -CXR negative  -CT Head showed  ventricles and sulci are prominent, compatible with age-related generalized cerebral volume loss, no hemorrhage  -f/u PT eval   -Fall Precautions, Aspiration Precautions  -f/u echocardiogram -pt p/w weakness with concern for syncope  -Generalized Weakness with Unwitnessed Syncope possibly due to Atrial Fibrillation:  -EGSYS Score= 3 points (Syncope during effort), Cardiac syncope likely (95% sensitivity), 17% mortality at 21-24 months for EGSYS =3  -CXR negative  -CT Head showed  ventricles and sulci are prominent, compatible with age-related generalized cerebral volume loss, no hemorrhage  -f/u PT eval   -Fall Precautions, Aspiration Precautions  -f/u echocardiogram  -SW consult for need of HHA

## 2021-06-19 NOTE — PHYSICAL THERAPY INITIAL EVALUATION ADULT - PATIENT/FAMILY AGREES WITH PLAN
Home without PT needs at this time./yes Home with outpatient PT needs for dynamic balance deficits./yes

## 2021-06-19 NOTE — CONSULT NOTE ADULT - ASSESSMENT
81 year old female with a PMH of short-term memory loss, HTN, HLD, Permanent Atrial Fibrillation (on Diltiazem and Multaq, Not on AC) who was BIBEMS due to generalized weakness. Admitted for Afib with RVR and concern for syncope.       Problem/Plan - 1:  ·  Problem: R/O Syncope.  Plan: -pt p/w weakness with concern for syncope  -Generalized Weakness with Unwitnessed Syncope possibly due to Atrial Fibrillation:  -EGSYS Score= 3 points (Syncope during effort), Cardiac syncope likely (95% sensitivity), 17% mortality at 21-24 months for EGSYS =3  -CXR negativeCT Head showed  ventricles and sulci are prominent, compatible with age-related generalized cerebral volume loss, no hemorrhage  -Rehab Consult  -Fall Precautions, Aspiration Precautions  -f/u echocardiogram.     Problem/Plan - 2:  ·  Problem: Atrial fibrillation.  Plan: -EKG showed Afib with RVR , QTC prolonged 526  -c/w home meds multaq and cardizem  -monitor on tele  -f/u echo  -trend troponins   No AC 2/2 GIB    Problem/Plan - 3:Problem: Troponin level elevated.  Plan: -HEART Score= 4 points Moderate Score, Risk of MACE of 12-16.6%.  -Troponin= 0.065   -Pro-BNP= 2,784  -TTE (6/7/2020) identified LVEF= 55-60%, Mitral annular calcification, Mild mitral regurgitation, Moderately dilated left atrium, RV systolic pressure is mildly increased at  44 mm Hg, Mild tricuspid regurgitation, Mild pulmonic regurgitation.  -trend troponins  -Likely demand    Problem/Plan - 4:  ·  Problem: VASHTI (acute kidney injury).  Plan: -Cr= 1.51 (Baseline= 1.45 on 6/12/2020)  - continue with very gentle IVF hydration  - follow UA and urine lytes   -f/u BMP in am.     Problem/Plan - 5:  ·  Problem: HTN (hypertension).  Plan: -c/w home medication cardizem  -monitor BP. Problem/Plan - 6:  Problem: Need for prophylactic measure. Plan: [ ] Previous VTE                                    3  [ ] Thrombophilia                                  2  [ ] Lower limb paralysis                        2  (unable to hold up >15 seconds)    [ ] Current Cancer (within 6 months)        2   [x] Immobilization > 24 hrs                    1  [ ] ICU/CCU stay > 24 hrs                      1  [x] Age > 60                                         1   SQH for prophylaxis  protonix.

## 2021-06-19 NOTE — PROGRESS NOTE ADULT - PROBLEM SELECTOR PLAN 2
-EKG showed Afib with RVR , QTC prolonged 526  -c/w home meds multaq and cardizem  -monitor on tele  -f/u echo  -trend troponins   -Cardio consulted Dr Olivier -EKG showed Afib with RVR , QTC prolonged 526  -c/w home meds multaq and cardizem  -monitor on tele  -f/u echo  - troponins trended down   -Cardio consulted Dr Olivier -EKG showed Afib with RVR , QTC prolonged 526  -c/w home meds multaq and cardizem  -cardizem to be uptitrated due to breakthrough Afib up to 140s on telemetry  -monitor on tele  -f/u echo  - troponins trended down   -Cardio consulted Dr Lopez

## 2021-06-19 NOTE — PHYSICAL THERAPY INITIAL EVALUATION ADULT - GENERAL OBSERVATIONS, REHAB EVAL
Pt. was found sleeping in bed supine.  Upon waking Pt. was NAD and A&Ox3, albeit forgetful.  Pt. was willing/motivated to participate in PT.

## 2021-06-19 NOTE — DISCHARGE NOTE PROVIDER - NSDCCPCAREPLAN_GEN_ALL_CORE_FT
PRINCIPAL DISCHARGE DIAGNOSIS  Diagnosis: Fall  Assessment and Plan of Treatment: You were admitted because your daughter found you on the floor.CT Head was done and showed  ventricles and sulci are prominent, compatible with age-related generalized cerebral volume loss, no hemorrhage or acute events. You were  seen by cardiologist Dr Lopez and was monitored on telemetry .  Echo was done and showed normal EF with moderate mitral regurge.EKG showed Afib with RVR ,  QTC prolonged 526, Cardiazem dose was increased to 240 mg daily .You are recommended to continue on cardiazem >>>      SECONDARY DISCHARGE DIAGNOSES  Diagnosis: Atrial fibrillation  Assessment and Plan of Treatment: You were admitted because your daughter found you on the floor.CT Head was done and showed  ventricles and sulci are prominent, compatible with age-related generalized cerebral volume loss, no hemorrhage or acute events. You were  seen by cardiologist Dr Lopez and was monitored on telemetry .  Echo was done and showed normal EF with moderate mitral regurge.EKG showed Afib with RVR ,  QTC prolonged 526, Cardiazem dose was increased to 240 mg daily .You are recommended to continue on cardiazem >>>    Diagnosis: CKD (chronic kidney disease)  Assessment and Plan of Treatment: You presented with elevated creatinine level , likely your base line, You are recommended to avoid nephrotoxic agents and follow up with Your nephrologist after discharge.    Diagnosis: Troponin level elevated  Assessment and Plan of Treatment: You came in after a fall and were found to have an elevated troponin, this is a a cadiac enzyme indicating myocardial (heart muscle) injury. Your troponin was trended and it went down. An elevation of cTn indicates the presence of, but not the underlying reason for, myocardial injury. Hence, besides acute myocardial infarction (AMI), there is a myriad of potential diseases with troponin release, including acute pulmonary embolism, heart failure, myocarditis, and end stage renal disease. In your case we trended your troponin and it improved on its own. Your troponin was likely due to an increased demand placed on your heart during this stressful event. Your EKG  showed sinus tachycardia and NO ST or T wave changes which would indicate a heart attack. Cardiology Dr. Lopez was consulted. You were monitored on the cardiology telemetry unit and you had Afib with RVR. Echocardiogram was performed and showed normal ejection fraction   Please follow up with your primary physician.      Diagnosis: HTN (hypertension)  Assessment and Plan of Treatment: Blood Pressure Control , Please continue current medication regimen, and follow up with your PCP  - You have a history of Hypertension.   - You should continue on the current antihypertensive regimen regularly.  - You blood pressure should be within 140-120/80-90.  - You should follow-up with your PCP within 1 week of your discharge for routine blood pressure monitoring at your next visit.  - Notify your doctor if you have any of the following symptoms:   (Dizziness, Lightheadedness, Blurry vision, Headache, Chest pain, Shortness of breath.)  - You should maintain healthy lifestyle by eating healthy low salt diet, avoid fatty food, weight loss, exercise regularly as tolerated 30 mins X 3 time per week.       PRINCIPAL DISCHARGE DIAGNOSIS  Diagnosis: Fall  Assessment and Plan of Treatment: You were admitted because your daughter found you on the floor.CT Head was done and showed  ventricles and sulci are prominent, compatible with age-related generalized cerebral volume loss, no hemorrhage or acute events. You were  seen by cardiologist Dr Lopez and was monitored on telemetry .  Echo was done and showed normal EF with moderate mitral regurge. EKG showed Afib with RVR ,  QTC prolonged 526, Cardiazem dose was increased to 240 mg daily .You are recommended to continue on cardiazem 240 mg daily and Multaq 400 mg twice daily . PT evaluated You and recommended outpatient PT      SECONDARY DISCHARGE DIAGNOSES  Diagnosis: Atrial fibrillation  Assessment and Plan of Treatment: You were admitted because your daughter found you on the floor.CT Head was done and showed  ventricles and sulci are prominent, compatible with age-related generalized cerebral volume loss, no hemorrhage or acute events. You were  seen by cardiologist Dr Lopez and was monitored on telemetry .  Echo was done and showed normal EF with moderate mitral regurge.EKG showed Afib with RVR ,  QTC prolonged 526, Cardiazem dose was increased to 240 mg daily .You are recommended to continue on cardiazem 240 mg daily and Multaq 400 mg twice daily . PT evaluated You and recommended outpatient PT    Diagnosis: CKD (chronic kidney disease)  Assessment and Plan of Treatment: You presented with elevated creatinine level , likely your base line, You are recommended to avoid nephrotoxic agents and follow up with Your nephrologist after discharge.    Diagnosis: Troponin level elevated  Assessment and Plan of Treatment: You came in after a fall and were found to have an elevated troponin, this is a a cadiac enzyme indicating myocardial (heart muscle) injury. Your troponin was trended and it went down. An elevation of cTn indicates the presence of, but not the underlying reason for, myocardial injury. Hence, besides acute myocardial infarction (AMI), there is a myriad of potential diseases with troponin release, including acute pulmonary embolism, heart failure, myocarditis, and end stage renal disease. In your case we trended your troponin and it improved on its own. Your troponin was likely due to an increased demand placed on your heart during this stressful event. Your EKG  showed sinus tachycardia and NO ST or T wave changes which would indicate a heart attack. Cardiology Dr. Lopez was consulted. You were monitored on the cardiology telemetry unit and you had Afib with RVR. Echocardiogram was performed and showed normal ejection fraction   Please follow up with your primary physician.      Diagnosis: HTN (hypertension)  Assessment and Plan of Treatment: Blood Pressure Control , Please continue current medication regimen, and follow up with your PCP  - You have a history of Hypertension.   - You should continue on the current antihypertensive regimen regularly.( diltiazem and multaq)  - You blood pressure should be within 140-120/80-90.  - You should follow-up with your PCP within 1 week of your discharge for routine blood pressure monitoring at your next visit.  - Notify your doctor if you have any of the following symptoms:   (Dizziness, Lightheadedness, Blurry vision, Headache, Chest pain, Shortness of breath.)  - You should maintain healthy lifestyle by eating healthy low salt diet, avoid fatty food, weight loss, exercise regularly as tolerated 30 mins X 3 time per week.       PRINCIPAL DISCHARGE DIAGNOSIS  Diagnosis: Fall  Assessment and Plan of Treatment: You were admitted because your daughter found you on the floor.CT Head was done and showed  ventricles and sulci are prominent, compatible with age-related generalized cerebral volume loss, no hemorrhage or acute events. You were  seen by cardiologist Dr Lopez and was monitored on telemetry .  Echo was done and showed normal EF with moderate mitral regurge. EKG showed Afib with RVR ,  QTC prolonged 526, Cardiazem dose was increased to 240 mg daily .You are recommended to continue on cardiazem 240 mg daily and Multaq 400 mg twice daily . PT evaluated You and recommended outpatient PT      SECONDARY DISCHARGE DIAGNOSES  Diagnosis: Atrial fibrillation  Assessment and Plan of Treatment: You were admitted because your daughter found you on the floor.CT Head was done and showed  ventricles and sulci are prominent, compatible with age-related generalized cerebral volume loss, no hemorrhage or acute events. You were  seen by cardiologist Dr Lopez and was monitored on telemetry .  Echo was done and showed normal EF with moderate mitral regurge.EKG showed Afib with RVR ,  QTC prolonged 526, Cardiazem dose was increased to 240 mg daily .You are recommended to continue on cardiazem 240 mg daily and Multaq 400 mg twice daily . PT evaluated You and recommended outpatient PT    Diagnosis: CKD (chronic kidney disease)  Assessment and Plan of Treatment: You presented with elevated creatinine level , likely your base line, You are recommended to avoid nephrotoxic agents and follow up with Your nephrologist after discharge.    Diagnosis: Troponin level elevated  Assessment and Plan of Treatment: You came in after a fall and were found to have an elevated troponin, this is a a cadiac enzyme indicating myocardial (heart muscle) injury. Your troponin was trended and it went down. An elevation of cTn indicates the presence of, but not the underlying reason for, myocardial injury. Hence, besides acute myocardial infarction (AMI), there is a myriad of potential diseases with troponin release, including acute pulmonary embolism, heart failure, myocarditis, and end stage renal disease. In your case we trended your troponin and it improved on its own. Your troponin was likely due to an increased demand placed on your heart during this stressful event. Your EKG  showed sinus tachycardia and NO ST or T wave changes which would indicate a heart attack. Cardiology Dr. Lopez was consulted. You were monitored on the cardiology telemetry unit and you had Afib with RVR. Echocardiogram was performed and showed normal ejection fraction   Please follow up with your primary physician.      Diagnosis: HTN (hypertension)  Assessment and Plan of Treatment: Blood Pressure Control , Please continue current medication regimen, and follow up with your PCP  - You have a history of Hypertension.   - You should continue on the current antihypertensive regimen regularly.( diltiazem and multaq)  - You blood pressure should be within 140-120/80-90.  - You should follow-up with your PCP within 1 week of your discharge for routine blood pressure monitoring at your next visit.  - Notify your doctor if you have any of the following symptoms:   (Dizziness, Lightheadedness, Blurry vision, Headache, Chest pain, Shortness of breath.)  - You should maintain healthy lifestyle by eating healthy low salt diet, avoid fatty food, weight loss, exercise regularly as tolerated 30 mins X 3 time per week.      Diagnosis: Superficial thrombophlebitis  Assessment and Plan of Treatment: You were found to have left  upper extremity swelling .     PRINCIPAL DISCHARGE DIAGNOSIS  Diagnosis: Fall  Assessment and Plan of Treatment: You were admitted because your daughter found you on the floor.CT Head was done and showed  ventricles and sulci are prominent, compatible with age-related generalized cerebral volume loss, no hemorrhage or acute events. You were  seen by cardiologist Dr Lopez and was monitored on telemetry .  Echo was done and showed normal EF with moderate mitral regurge. EKG showed Afib with RVR ,  QTC prolonged 526, Cardiazem dose was increased to 240 mg daily .You are recommended to continue on cardiazem 240 mg daily and Multaq 400 mg twice daily . PT evaluated You and recommended outpatient PT      SECONDARY DISCHARGE DIAGNOSES  Diagnosis: Atrial fibrillation  Assessment and Plan of Treatment: You were admitted because your daughter found you on the floor.CT Head was done and showed  ventricles and sulci are prominent, compatible with age-related generalized cerebral volume loss, no hemorrhage or acute events. You were  seen by cardiologist Dr Lopez and was monitored on telemetry .  Echo was done and showed normal EF with moderate mitral regurge.EKG showed Afib with RVR ,  QTC prolonged 526, Cardiazem dose was increased to 240 mg daily .You are recommended to continue on cardiazem 240 mg daily and Multaq 400 mg twice daily . PT evaluated You and recommended outpatient PT    Diagnosis: CKD (chronic kidney disease)  Assessment and Plan of Treatment: You presented with elevated creatinine level , likely your base line, You are recommended to avoid nephrotoxic agents and follow up with Your nephrologist after discharge.    Diagnosis: Troponin level elevated  Assessment and Plan of Treatment: You came in after a fall and were found to have an elevated troponin, this is a a cadiac enzyme indicating myocardial (heart muscle) injury. Your troponin was trended and it went down. An elevation of cTn indicates the presence of, but not the underlying reason for, myocardial injury. Hence, besides acute myocardial infarction (AMI), there is a myriad of potential diseases with troponin release, including acute pulmonary embolism, heart failure, myocarditis, and end stage renal disease. In your case we trended your troponin and it improved on its own. Your troponin was likely due to an increased demand placed on your heart during this stressful event. Your EKG  showed sinus tachycardia and NO ST or T wave changes which would indicate a heart attack. Cardiology Dr. Lopez was consulted. You were monitored on the cardiology telemetry unit and you had Afib with RVR. Echocardiogram was performed and showed normal ejection fraction   Please follow up with your primary physician.      Diagnosis: HTN (hypertension)  Assessment and Plan of Treatment: Blood Pressure Control , Please continue current medication regimen, and follow up with your PCP  - You have a history of Hypertension.   - You should continue on the current antihypertensive regimen regularly.( diltiazem and multaq)  - You blood pressure should be within 140-120/80-90.  - You should follow-up with your PCP within 1 week of your discharge for routine blood pressure monitoring at your next visit.  - Notify your doctor if you have any of the following symptoms:   (Dizziness, Lightheadedness, Blurry vision, Headache, Chest pain, Shortness of breath.)  - You should maintain healthy lifestyle by eating healthy low salt diet, avoid fatty food, weight loss, exercise regularly as tolerated 30 mins X 3 time per week.      Diagnosis: Superficial thrombophlebitis  Assessment and Plan of Treatment: You were found to have left  upper extremity swelling . US doppler Left upper extremity showed No evidence of left upper extremity deep venous thrombosis. Superficial thrombophlebitis involving the basilic vein was found . You are recommended to elevate the arm and put hot compressions and if you notice any signs of infection like pus , fever or increase flactuations ,You are recommended to come back to the hospital. You are recommended to repeat US in 5-7 days if not improving.

## 2021-06-19 NOTE — PROGRESS NOTE ADULT - ATTENDING COMMENTS
Patient is an 81 year old female with a PMH of HTN, HLD, Permanent Atrial Fibrillation (on Diltiazem and Multaq, Not on AC) who was BIBEMS due to generalized weakness.  Patient is awake and alert, though a fair-to-poor historian.  Therefore a significant amount of information was obtained from APRILK (Nae Mariano, Daughter, [phone]) on admission.  Patient and NOK have reportedly been in [place] for the past several days/week(s) and although patient has been compliant with her prescribed medications, her Multaq ran out 3 days prior to current presentation.  However, upon returning home to [place] from [place], patient again resumed taking her refilled Multaq as prescribed. On the day of current presentation at approximately 10:30AM, NOK reports that patient "wobbled" to the bathroom (reportedly normal for patient), though only after several hours of not hearing from patient, NOK went to check on her and found patient on the bathroom floor.        A/P:    Generalized Weakness with unwitnessed Syncope possibly due to Atrial Fibrillation  Chronic atrial fibrillation  Troponinemia due to type 2 MI  VASHTI on CKD  HTN  HLD  Mild rhabdomyolysis    -EGSYS Score= 3 points (Syncope during effort), Cardiac syncope likely (95% sensitivity), 17% mortality at 21-24 months for EGSYS =3  -CT Head identified ventricles and sulci are prominent, compatible with age-related generalized cerebral volume loss, no CT evidence for acute cerebral cortical infarct. There is no evidence of hemorrhage. There is periventricular and subcortical white matter hypoattenuation,  most compatible with chronic microvascular ischemic changes.  -Continue with telemetry monitoring  -Continue patient's home medications: Multaq 400mg PO BID and Diltiazem CD 180mg PO qd  -TTE (with attention to mPAP and LVEF)  - Troponins trending down  -Fluid Restrictions, Daily Weights, Strict I&Os, Low Sodium diet as tolerated  -Cardiology consult for further recommendations  -PT Consult  -Fall Precautions, Aspiration Precautions  - creatinine slightly improving, continue with IV fluids  -Vital Signs Q4H  -Will resume patient's home medications  - slightly elevated CK from fall, continue to monitor    GI/DVT PPx:  -Heparin  -Pepcid Patient is an 81 year old female with a PMH of HTN, HLD, Permanent Atrial Fibrillation (on Diltiazem and Multaq, Not on AC) who was BIBEMS due to generalized weakness.  Patient is awake and alert, though a fair-to-poor historian.  Therefore a significant amount of information was obtained from APRILK (Nae Mariano, Daughter, [phone]) on admission.  Patient and NOK have reportedly been in [place] for the past several days/week(s) and although patient has been compliant with her prescribed medications, her Multaq ran out 3 days prior to current presentation.  However, upon returning home to [place] from [place], patient again resumed taking her refilled Multaq as prescribed. On the day of current presentation at approximately 10:30AM, NOK reports that patient "wobbled" to the bathroom (reportedly normal for patient), though only after several hours of not hearing from patient, NOK went to check on her and found patient on the bathroom floor.        A/P:    Generalized Weakness with unwitnessed Syncope possibly due to Atrial Fibrillation  Chronic atrial fibrillation  Troponinemia due to type 2 MI  VASHTI on CKD  HTN  HLD  Mild rhabdomyolysis    -EGSYS Score= 3 points (Syncope during effort), Cardiac syncope likely (95% sensitivity), 17% mortality at 21-24 months for EGSYS =3  -CT Head identified ventricles and sulci are prominent, compatible with age-related generalized cerebral volume loss, no CT evidence for acute cerebral cortical infarct. There is no evidence of hemorrhage. There is periventricular and subcortical white matter hypoattenuation,  most compatible with chronic microvascular ischemic changes.  -Continue with telemetry monitoring  -Continue patient's home medications: Multaq 400mg PO BID and Diltiazem CD 180mg PO qd  -TTE (with attention to mPAP and LVEF)  - Troponins trending down  -Fluid Restrictions, Daily Weights, Strict I&Os, Low Sodium diet as tolerated  -Cardiology consult for further recommendations  -PT Consult  -Fall Precautions, Aspiration Precautions  - creatinine slightly improving, continue with IV fluids  -Vital Signs Q4H  -Will resume patient's home medications  - slightly elevated CK from fall, continue to monitor    GI/DVT PPx:  -Heparin  -Protonix

## 2021-06-19 NOTE — DISCHARGE NOTE PROVIDER - NSDCMRMEDTOKEN_GEN_ALL_CORE_FT
aspirin 81 mg oral tablet, chewable: 1 tab(s) orally once a day  atorvastatin 80 mg oral tablet: 1 tab(s) orally once a day  DilTIAZem Hydrochloride  mg/24 hours oral capsule, extended release: 1 cap(s) orally once a day  ferrous sulfate (as elemental iron) 45 mg oral tablet, extended release: 1 tab(s) orally once a day  Multaq 400 mg oral tablet: 1 tab(s) orally 2 times a day  Protonix 40 mg oral delayed release tablet: 1 tab(s) orally once a day  Vitamin D3 50,000 intl units (1250 mcg) oral capsule:    aspirin 81 mg oral tablet, chewable: 1 tab(s) orally once a day  atorvastatin 80 mg oral tablet: 1 tab(s) orally once a day  dilTIAZem 240 mg/24 hours oral capsule, extended release: 1 cap(s) orally once a day  Multaq 400 mg oral tablet: 1 tab(s) orally 2 times a day  Protonix 40 mg oral delayed release tablet: 1 tab(s) orally once a day  Vitamin D3 1000 intl units (25 mcg) oral capsule: 1 cap(s) orally once a day

## 2021-06-19 NOTE — CONSULT NOTE ADULT - SUBJECTIVE AND OBJECTIVE BOX
DATE OF SERVICE:  06/19/2021  Patient was seen,examined and evaluated  by me.ER evaluation, Labs and Hospital course was reviewed,    CHIEF COMPLAINT:Weakness    HPI:HPI:  Patient, an 81 year old female with a PMH of short-term memory loss, HTN, HLD, Permanent Atrial Fibrillation (on Diltiazem and Multaq, Not on AC) who was BIBEMS due to generalized weakness. History obtained from daughter Nae at bedside. As per her, patient was in [place] with her for 4 weeks where she ran out of her Multaq 3 days ago. They returned back on Wednesday (2 days ago) and patient took her medication yesterday. Patient has been feeling tired since yesterday. Today at around 1.30 pm, when daughter went to see patient (Lives in same building), she found the patient sitting on bathroom floor. Patient does not remember what happened. Unclear if any LOC or head trauma but no injuries as per daughter. Patient told the daughter she was cleaning. Patient denied any chest pain, SOB at the time. At time of examination in the ED, patient denies any headache, dizziness, chest pain, palpitations, shortness of breath, abdominal pain, nausea/vomiting/diarrhea.  As per daughter, patient had bleeding from eliquis in the past so it was stopped.     GOC: FULL CODE  (18 Jun 2021 23:04)      PAST MEDICAL & SURGICAL HISTORY:  Atrial fibrillation    HTN (hypertension)    HLD (hyperlipidemia)    DM (diabetes mellitus)    History of broken finger        MEDICATIONS  (STANDING):  aspirin  chewable 81 milliGRAM(s) Oral daily  atorvastatin 40 milliGRAM(s) Oral at bedtime  diltiazem    milliGRAM(s) Oral daily  dronedarone 400 milliGRAM(s) Oral two times a day  heparin   Injectable 5000 Unit(s) SubCutaneous every 8 hours  pantoprazole    Tablet 40 milliGRAM(s) Oral before breakfast        FAMILY HISTORY:    No family history of premature coronary artery disease or sudden cardiac death    SOCIAL HISTORY:  Smoking-[ ] Active  [ ] Former [x ] Non Smoker  Alcohol-[x ] Denies [ ] Social [ ] Daily  Ilicit Drug use-[x ] Denies [ ] Active user    REVIEW OF SYSTEMS:  Constitutional: [ ] fever, [ ]weight loss, [x ]fatigue   Activity [ ] Bedbound,[x ] Ambulates [x ] Unassisted[ ] Cane/Walker [ ] Assistence.  Effort tolerance:[ ] Excellent [ ] Good [x ] Fair [ ] Poor [ ]  Eyes: [ ] visual changes  Respiratory: [ ]shortness of breath;  [ ] cough, [ ]wheezing, [ ]chills, [ ]hemoptysis  Cardiovascular: [ ] chest pain, [ ]palpitations, [ ]dizziness,  [ ]leg swelling[ ]orthopnea [ ]PND  Gastrointestinal: [ ] abdominal pain, [ ]nausea, [ ]vomiting,  [ ]diarrhea,[ ]constipation  Genitourinary: [ ] dysuria, [ ] hematuria  Neurologic: [ ] headaches [ ] tremors[x ] weakness  Skin: [ ] itching, [ ]burning, [ ] rashes  Endocrine: [ ] heat or cold intolerance  Musculoskeletal: [ ] joint pain or swelling; [ ] muscle, back, or extremity pain  Psychiatric: [ ] depression, [ ]anxiety, [ ]mood swings, or [ ]difficulty sleeping  Hematologic: [ ] easy bruising, [ ] bleeding gums       [ x] All others negative	  [ ] Unable to obtain    Vital Signs Last 24 Hrs  T(C): 36.6 (19 Jun 2021 07:19), Max: 36.8 (18 Jun 2021 15:40)  T(F): 97.9 (19 Jun 2021 07:19), Max: 98.3 (18 Jun 2021 15:40)  HR: 81 (19 Jun 2021 08:45) (78 - 106)  BP: 145/93 (19 Jun 2021 08:45) (139/- - 156/107)  BP(mean): 87 (19 Jun 2021 04:41) (87 - 87)  RR: 19 (19 Jun 2021 07:19) (16 - 19)  SpO2: 99% (19 Jun 2021 08:45) (97% - 100%)  I&O's Summary    18 Jun 2021 07:01  -  19 Jun 2021 07:00  --------------------------------------------------------  IN: 50 mL / OUT: 200 mL / NET: -150 mL        PHYSICAL EXAM:  General: No acute distress BMI-24  HEENT: EOMI, PERRL[ ] Icteric  Neck: Supple, No JVD  Lungs: Equal air entry bilaterally; [ ] Rales [ ] Rhonchi [ ] Wheezing  Heart: Irregular rate and rhythm;[x ] Murmurs-   2/6 [x ] Systolic [ ] Diastolic [ ] Radiation,No rubs, or gallops  Abdomen: Nontender, bowel sounds present  Extremities: No clubbing, cyanosis, or edema[ ] Calf tenderness  Nervous system:  Alert & Oriented X3, no focal deficits  Psychiatric: Normal affect  Skin: No rashes or lesions      LABS:  06-19    142  |  110<H>  |  20<H>  ----------------------------<  98  3.7   |  22  |  1.30    Ca    8.3<L>      19 Jun 2021 06:32  Phos  3.3     06-19  Mg     2.1     06-19    TPro  7.0  /  Alb  3.0<L>  /  TBili  0.9  /  DBili  0.3<H>  /  AST  21  /  ALT  21  /  AlkPhos  55  06-19    Creatinine Trend: 1.30<--, 1.51<--                        13.5   4.57  )-----------( 139      ( 19 Jun 2021 06:32 )             41.2         Lipid Panel: Cholesterol, Serum 110  HDL Cholesterol, Serum 48  Triglycerides, Serum 70    Cardiac Enzymes: CARDIAC MARKERS ( 19 Jun 2021 06:32 )  0.062 ng/mL / x     / x     / x     / x      CARDIAC MARKERS ( 19 Jun 2021 01:19 )  0.063 ng/mL / x     / 256 U/L / x     / 2.8 ng/mL  CARDIAC MARKERS ( 18 Jun 2021 19:29 )  x     / x     / 238 U/L / x     / x      CARDIAC MARKERS ( 18 Jun 2021 17:05 )  0.065 ng/mL / x     / x     / x     / x          Serum Pro-Brain Natriuretic Peptide: 2784 pg/mL (06-18-21 @ 17:05)        RADIOLOGY:CT HEAD  IMPRESSION:  No evidence of acute intracranial abnormality.  No evidence of hemorrhage.  Chronic changes as above.        ECG [my interpretation]:Atrial Fibrillation LBVH no acute ST T wave abnormalities    TELEMETRY:Atrial Fibrillation ~~100's    ECHO:Study Date: 6/7/2020  CONCLUSIONS:  1. Mitral annular calcification. Mild mitral regurgitation.  2. Calcified trileaflet aortic valve with normal opening.Trace aortic regurgitation.  3. Aortic Root: 3.2 cm.  4. Moderately dilated left atrium.  LA volume index = 48 cc/m2.  5. Normal left ventricular internal dimensions and wall thicknesses.  6. Normal Left Ventricular Systolic Function,  (EF = 55 to 60%)  7. Normal diastolic function.  8. Normal right atrium.  9. Normal right ventricular size and systolic function (TAPSE  2.6cm).  10. RA Pressure is 8 mm Hg.  11. RV systolic pressure is mildly increased at  44 mm Hg.  12. There is mild tricuspid regurgitation.13. There is mild pulmonic regurgitation.  14. Normal pericardium with no pericardial effusion.

## 2021-06-19 NOTE — DISCHARGE NOTE PROVIDER - HOSPITAL COURSE
81 year old female with a PMH of short-term memory loss, HTN, HLD, Permanent Atrial Fibrillation (on Diltiazem and Multaq, Not on AC) who was BIBEMS due to generalized weakness. History obtained from daughter Nae at bedside. As per her, patient was in [place] with her for 4 weeks where she ran out of her Multaq 3 days ago. They returned back on Wednesday (2 days ago) and patient took her medication yesterday. Patient has been feeling tired since yesterday. Today at around 1.30 pm, when daughter went to see patient (Lives in same building), she found the patient sitting on bathroom floor.   CT Head was done and showed  ventricles and sulci are prominent, compatible with age-related generalized cerebral volume loss, no hemorrhage or acute events.   . Patient was seen by cardiologist Dr Lopez and was monitored on telemetry .  Echo was done and showed >>. PT evaluated the patient and   81 year old female with a PMH of short-term memory loss, HTN, HLD, Permanent Atrial Fibrillation (on Diltiazem and Multaq, Not on AC) who was BIBEMS due to generalized weakness. History obtained from daughter Nae at bedside. As per her, patient was in [place] with her for 4 weeks where she ran out of her Multaq 3 days ago. They returned back on Wednesday (2 days ago) and patient took her medication yesterday. Patient has been feeling tired since yesterday. Today at around 1.30 pm, when daughter went to see patient (Lives in same building), she found the patient sitting on bathroom floor.   CT Head was done and showed  ventricles and sulci are prominent, compatible with age-related generalized cerebral volume loss, no hemorrhage or acute events.   . Patient was seen by cardiologist Dr Lopez and was monitored on telemetry .  Echo was done and showed normal EF with moderate mitral regurge. PT evaluated the patient and recommended home with home PT . Cardiazem dose was increased to 240 mg daily . patient is to be discharged on >>>  For Atrial fibrillation, EKG showed Afib with RVR ,  QTC prolonged 526, Patient was seen by cardiologist Dr Lopez and was monitored on telemetry .  Echo was done and showed normal EF with moderate mitral regurge . Cardiazem dose was increased to 240 mg daily .    81 year old female with a PMH of short-term memory loss, HTN, HLD, Permanent Atrial Fibrillation (on Diltiazem and Multaq, Not on AC) who was BIBEMS due to generalized weakness. History obtained from daughter Nae at bedside. As per her, patient was in [place] with her for 4 weeks where she ran out of her Multaq 3 days ago. They returned back on Wednesday (2 days ago) and patient took her medication yesterday. Patient has been feeling tired since yesterday. Today at around 1.30 pm, when daughter went to see patient (Lives in same building), she found the patient sitting on bathroom floor.   CT Head was done and showed  ventricles and sulci are prominent, compatible with age-related generalized cerebral volume loss, no hemorrhage or acute events.    Patient was seen by cardiologist Dr Lopez and was monitored on telemetry .  Echo was done and showed normal EF with moderate mitral regurge. PT evaluated the patient and recommended home with home PT . Cardiazem dose was increased to 240 mg daily . patient is to be discharged on cardiazem 240 mg daily and Multaq 400 mg twice daily   For Atrial fibrillation, EKG showed Afib with RVR ,  QTC prolonged 526, Patient was seen by cardiologist Dr Lopez and was monitored on telemetry .  Echo was done and showed normal EF with moderate mitral regurge . Cardiazem dose was increased to 240 mg daily . patient is to be discharged on cardiazem 240 mg daily and Multaq 400 mg twice daily . PT evaluated the patient and recommended outpatient PT       Given patient's improved clinical status and current hemodynamic stability, decision was made to discharge.  Please refer to patient's complete medical chart with documents for a full hospital course, for this is only a brief summary.     81 year old female with a PMH of short-term memory loss, HTN, HLD, Permanent Atrial Fibrillation (on Diltiazem and Multaq, Not on AC) who was BIBEMS due to generalized weakness. History obtained from daughter Nae at bedside. As per her, patient was in [place] with her for 4 weeks where she ran out of her Multaq 3 days ago. They returned back on Wednesday (2 days ago) and patient took her medication yesterday. Patient has been feeling tired since yesterday. Today at around 1.30 pm, when daughter went to see patient (Lives in same building), she found the patient sitting on bathroom floor.   CT Head was done and showed  ventricles and sulci are prominent, compatible with age-related generalized cerebral volume loss, no hemorrhage or acute events.    Patient was seen by cardiologist Dr Lopez and was monitored on telemetry .  Echo was done and showed normal EF with moderate mitral regurge. PT evaluated the patient and recommended home with home PT . Cardiazem dose was increased to 240 mg daily . patient is to be discharged on cardiazem 240 mg daily and Multaq 400 mg twice daily   For Atrial fibrillation, EKG showed Afib with RVR ,  QTC prolonged 526, Patient was seen by cardiologist Dr Lopez and was monitored on telemetry .  Echo was done and showed normal EF with moderate mitral regurge . Cardiazem dose was increased to 240 mg daily . patient is to be discharged on cardiazem 240 mg daily and Multaq 400 mg twice daily . US left upper extremity showed No evidence of left upper extremity deep venous thrombosis. Superficial thrombophlebitis involving the basilic vein. PT evaluated the patient and recommended outpatient PT       Given patient's improved clinical status and current hemodynamic stability, decision was made to discharge.  Please refer to patient's complete medical chart with documents for a full hospital course, for this is only a brief summary.     81 year old female with a PMH of short-term memory loss, HTN, HLD, Permanent Atrial Fibrillation (on Diltiazem and Multaq, Not on AC) who was BIBEMS due to generalized weakness. History obtained from daughter Nae at bedside. As per her, patient was in [place] with her for 4 weeks where she ran out of her Multaq 3 days ago. They returned back on Wednesday (2 days ago) and patient took her medication yesterday. Patient has been feeling tired since yesterday. Today at around 1.30 pm, when daughter went to see patient (Lives in same building), she found the patient sitting on bathroom floor.   CT Head was done and showed  ventricles and sulci are prominent, compatible with age-related generalized cerebral volume loss, no hemorrhage or acute events.    Patient was seen by cardiologist Dr Lopez and was monitored on telemetry .  Echo was done and showed normal EF with moderate mitral regurge. PT evaluated the patient and recommended home with home PT . Cardiazem dose was increased to 240 mg daily . patient is to be discharged on cardiazem 240 mg daily and Multaq 400 mg twice daily   For Atrial fibrillation, EKG showed Afib with RVR ,  QTC prolonged 526, Patient was seen by cardiologist Dr Lopez and was monitored on telemetry .  Echo was done and showed normal EF with moderate mitral regurge . Cardiazem dose was increased to 240 mg daily . patient is to be discharged on cardiazem 240 mg daily and Multaq 400 mg twice daily . US left upper extremity showed No evidence of left upper extremity deep venous thrombosis. Superficial thrombophlebitis involving the basilic vein. PT evaluated the patient and recommended outpatient PT       Given patient's improved clinical status and current hemodynamic stability, decision was made to discharge.  Please refer to patient's complete medical chart with documents for a full hospital course, for this is only a brief summary.    Med Attending Addendum for day of discharge  Patient seen and evaluated at bedside on the day of discharge  US of LUE revealed thrombophlebitis in basilic vein which was much improved with elevation and warm compress on assessment today. There was no fever or suppurative findings to suggest infectious process.   Updates were provided to the patients daughter the day prior and day of discharge. She will follow up with cardiology  dx at discharge  1. atrial fibrillation with RVR  2. LE edema  3. dementia  4. suspected CKD Stage 3  5. prolonged QTC  6. left arm superficial thrombophlebitis  35 min spent  discharge planning and coordination of care

## 2021-06-19 NOTE — PHYSICAL THERAPY INITIAL EVALUATION ADULT - DIAGNOSIS, PT EVAL
Pt. currently presents with mild forgetfulness/confusion.  Pt. is independent in functional mobility with reductions in safety awareness secondary to forgetfulness. Pt. currently presents with mild forgetfulness/confusion.  Pt. is independent in functional mobility with reductions in safety awareness secondary to forgetfulness.  Mild dynamic standing balance deficits noted.

## 2021-06-20 LAB
ALBUMIN SERPL ELPH-MCNC: 3 G/DL — LOW (ref 3.5–5)
ALP SERPL-CCNC: 57 U/L — SIGNIFICANT CHANGE UP (ref 40–120)
ALT FLD-CCNC: 31 U/L DA — SIGNIFICANT CHANGE UP (ref 10–60)
ANION GAP SERPL CALC-SCNC: 11 MMOL/L — SIGNIFICANT CHANGE UP (ref 5–17)
AST SERPL-CCNC: 34 U/L — SIGNIFICANT CHANGE UP (ref 10–40)
BASOPHILS # BLD AUTO: 0.03 K/UL — SIGNIFICANT CHANGE UP (ref 0–0.2)
BASOPHILS NFR BLD AUTO: 0.8 % — SIGNIFICANT CHANGE UP (ref 0–2)
BILIRUB SERPL-MCNC: 1 MG/DL — SIGNIFICANT CHANGE UP (ref 0.2–1.2)
BUN SERPL-MCNC: 23 MG/DL — HIGH (ref 7–18)
CALCIUM SERPL-MCNC: 8.6 MG/DL — SIGNIFICANT CHANGE UP (ref 8.4–10.5)
CHLORIDE SERPL-SCNC: 110 MMOL/L — HIGH (ref 96–108)
CK SERPL-CCNC: 256 U/L — HIGH (ref 21–215)
CO2 SERPL-SCNC: 21 MMOL/L — LOW (ref 22–31)
CREAT SERPL-MCNC: 1.43 MG/DL — HIGH (ref 0.5–1.3)
EOSINOPHIL # BLD AUTO: 0.26 K/UL — SIGNIFICANT CHANGE UP (ref 0–0.5)
EOSINOPHIL NFR BLD AUTO: 6.7 % — HIGH (ref 0–6)
GLUCOSE SERPL-MCNC: 88 MG/DL — SIGNIFICANT CHANGE UP (ref 70–99)
HCT VFR BLD CALC: 40.3 % — SIGNIFICANT CHANGE UP (ref 34.5–45)
HGB BLD-MCNC: 13.2 G/DL — SIGNIFICANT CHANGE UP (ref 11.5–15.5)
IMM GRANULOCYTES NFR BLD AUTO: 0.3 % — SIGNIFICANT CHANGE UP (ref 0–1.5)
LYMPHOCYTES # BLD AUTO: 1.63 K/UL — SIGNIFICANT CHANGE UP (ref 1–3.3)
LYMPHOCYTES # BLD AUTO: 42.1 % — SIGNIFICANT CHANGE UP (ref 13–44)
MAGNESIUM SERPL-MCNC: 2.3 MG/DL — SIGNIFICANT CHANGE UP (ref 1.6–2.6)
MCHC RBC-ENTMCNC: 31.6 PG — SIGNIFICANT CHANGE UP (ref 27–34)
MCHC RBC-ENTMCNC: 32.8 GM/DL — SIGNIFICANT CHANGE UP (ref 32–36)
MCV RBC AUTO: 96.4 FL — SIGNIFICANT CHANGE UP (ref 80–100)
MONOCYTES # BLD AUTO: 0.32 K/UL — SIGNIFICANT CHANGE UP (ref 0–0.9)
MONOCYTES NFR BLD AUTO: 8.3 % — SIGNIFICANT CHANGE UP (ref 2–14)
NEUTROPHILS # BLD AUTO: 1.62 K/UL — LOW (ref 1.8–7.4)
NEUTROPHILS NFR BLD AUTO: 41.8 % — LOW (ref 43–77)
NRBC # BLD: 0 /100 WBCS — SIGNIFICANT CHANGE UP (ref 0–0)
PHOSPHATE SERPL-MCNC: 3.2 MG/DL — SIGNIFICANT CHANGE UP (ref 2.5–4.5)
PLATELET # BLD AUTO: 126 K/UL — LOW (ref 150–400)
POTASSIUM SERPL-MCNC: 4.2 MMOL/L — SIGNIFICANT CHANGE UP (ref 3.5–5.3)
POTASSIUM SERPL-SCNC: 4.2 MMOL/L — SIGNIFICANT CHANGE UP (ref 3.5–5.3)
PROT SERPL-MCNC: 7 G/DL — SIGNIFICANT CHANGE UP (ref 6–8.3)
RBC # BLD: 4.18 M/UL — SIGNIFICANT CHANGE UP (ref 3.8–5.2)
RBC # FLD: 15.7 % — HIGH (ref 10.3–14.5)
SODIUM SERPL-SCNC: 142 MMOL/L — SIGNIFICANT CHANGE UP (ref 135–145)
WBC # BLD: 3.87 K/UL — SIGNIFICANT CHANGE UP (ref 3.8–10.5)
WBC # FLD AUTO: 3.87 K/UL — SIGNIFICANT CHANGE UP (ref 3.8–10.5)

## 2021-06-20 PROCEDURE — 99232 SBSQ HOSP IP/OBS MODERATE 35: CPT

## 2021-06-20 RX ORDER — DILTIAZEM HCL 120 MG
240 CAPSULE, EXT RELEASE 24 HR ORAL DAILY
Refills: 0 | Status: DISCONTINUED | OUTPATIENT
Start: 2021-06-21 | End: 2021-06-22

## 2021-06-20 RX ORDER — SODIUM CHLORIDE 9 MG/ML
1000 INJECTION INTRAMUSCULAR; INTRAVENOUS; SUBCUTANEOUS
Refills: 0 | Status: DISCONTINUED | OUTPATIENT
Start: 2021-06-20 | End: 2021-06-22

## 2021-06-20 RX ADMIN — PANTOPRAZOLE SODIUM 40 MILLIGRAM(S): 20 TABLET, DELAYED RELEASE ORAL at 06:10

## 2021-06-20 RX ADMIN — HEPARIN SODIUM 5000 UNIT(S): 5000 INJECTION INTRAVENOUS; SUBCUTANEOUS at 06:11

## 2021-06-20 RX ADMIN — Medication 180 MILLIGRAM(S): at 06:10

## 2021-06-20 RX ADMIN — HEPARIN SODIUM 5000 UNIT(S): 5000 INJECTION INTRAVENOUS; SUBCUTANEOUS at 14:55

## 2021-06-20 RX ADMIN — DRONEDARONE 400 MILLIGRAM(S): 400 TABLET, FILM COATED ORAL at 17:40

## 2021-06-20 RX ADMIN — DRONEDARONE 400 MILLIGRAM(S): 400 TABLET, FILM COATED ORAL at 06:10

## 2021-06-20 RX ADMIN — Medication 60 MILLIGRAM(S): at 06:10

## 2021-06-20 RX ADMIN — Medication 81 MILLIGRAM(S): at 11:29

## 2021-06-20 RX ADMIN — SODIUM CHLORIDE 50 MILLILITER(S): 9 INJECTION INTRAMUSCULAR; INTRAVENOUS; SUBCUTANEOUS at 10:29

## 2021-06-20 NOTE — PROGRESS NOTE ADULT - PROBLEM SELECTOR PLAN 3
-Troponin= 0.065 on admission , trended down   -Pro-BNP= 2,784  -TTE (6/7/2020) identified LVEF= 55-60%, Mitral annular calcification, Mild mitral regurgitation, Moderately dilated left atrium, RV systolic pressure is mildly increased at  44 mm Hg, Mild tricuspid regurgitation, Mild pulmonic regurgitation.  -troponins trended down   -echocardiogram showing normal EF with moderate mitral regurg  -likely demand ischemia in setting of Afib with RVR

## 2021-06-20 NOTE — PROGRESS NOTE ADULT - PROBLEM SELECTOR PLAN 2
-EKG showed Afib with RVR , QTC prolonged 526  -cardizem to be uptitrated due to breakthrough Afib up to 140s on telemetry  -cardizem CD 240mg daily starting 6/21  -echocardiogram showing normal EF with moderate mitral regurg  - troponins trended down   -monitor on telemetry  -Cardiology Dr Lopez

## 2021-06-20 NOTE — PROGRESS NOTE ADULT - SUBJECTIVE AND OBJECTIVE BOX
Patient is a 81y old  Female who presents with a chief complaint of weakness (19 Jun 2021 11:27)      INTERVAL HPI/OVERNIGHT EVENTS: no events overnight per patient and per nursing, denying chest pain, shortness of breath, all other ROS negative      T(C): 36.6 (06-20-21 @ 15:41), Max: 36.9 (06-19-21 @ 19:25)  HR: 72 (06-20-21 @ 15:41) (72 - 102)  BP: 133/83 (06-20-21 @ 15:41) (128/73 - 144/95)  RR: 17 (06-20-21 @ 15:41) (17 - 18)  SpO2: 100% (06-20-21 @ 15:41) (94% - 100%)        I&O's Summary    19 Jun 2021 07:01  -  20 Jun 2021 07:00  --------------------------------------------------------  IN: 175 mL / OUT: 600 mL / NET: -425 mL        REVIEW OF SYSTEMS: denies fever, chills, SOB, palpitations, chest pain, abdominal pain, nausea, vomitting, diarrhea, constipation, dizziness    MEDICATIONS  (STANDING):  aspirin  chewable 81 milliGRAM(s) Oral daily  atorvastatin 40 milliGRAM(s) Oral at bedtime  dronedarone 400 milliGRAM(s) Oral two times a day  heparin   Injectable 5000 Unit(s) SubCutaneous every 8 hours  pantoprazole    Tablet 40 milliGRAM(s) Oral before breakfast  sodium chloride 0.9%. 1000 milliLiter(s) (50 mL/Hr) IV Continuous <Continuous>    MEDICATIONS  (PRN):      PHYSICAL EXAM:  GENERAL: NAD,  well-developed  HEAD:  Atraumatic, Normocephalic  EYES: EOMI,  conjunctiva and sclera clear  ENMT:  Moist mucous membranes, Good dentition, No lesions  NECK: Supple, No JVD   NERVOUS SYSTEM:  Alert & Oriented X2-3, Fair concentration   CHEST/LUNG: Clear to auscultation bilaterally; No rales, rhonchi, wheezing, or rubs  HEART: Regular rate and rhythm; No murmurs, rubs, or gallops  ABDOMEN: Soft, Nontender, Nondistended; Bowel sounds present  EXTREMITIES:  2+ Peripheral Pulses, No clubbing, cyanosis, or edema        LABS:                        13.2   3.87  )-----------( 126      ( 20 Jun 2021 08:12 )             40.3     06-20    142  |  110<H>  |  23<H>  ----------------------------<  88  4.2   |  21<L>  |  1.43<H>    Ca    8.6      20 Jun 2021 08:12  Phos  3.2     06-20  Mg     2.3     06-20    TPro  7.0  /  Alb  3.0<L>  /  TBili  1.0  /  DBili  x   /  AST  34  /  ALT  31  /  AlkPhos  57  06-20

## 2021-06-20 NOTE — PROGRESS NOTE ADULT - PROBLEM SELECTOR PLAN 1
-pt p/w weakness with concern for syncope  -Generalized Weakness with Unwitnessed Syncope possibly due to Atrial Fibrillation:  -EGSYS Score= 3 points (Syncope during effort), Cardiac syncope likely (95% sensitivity), 17% mortality at 21-24 months for EGSYS =3  -CXR negative  -CT Head showed  ventricles and sulci are prominent, compatible with age-related generalized cerebral volume loss, no hemorrhage  - PT eval recommending home with home PT  -Fall Precautions, Aspiration Precautions  - echocardiogram showing normal EF with moderate mitral regurg  - CM notified family request for HHA for increased supervision and aid at home as patient lives in her apartment alone

## 2021-06-21 DIAGNOSIS — N18.9 CHRONIC KIDNEY DISEASE, UNSPECIFIED: ICD-10-CM

## 2021-06-21 LAB
ALBUMIN SERPL ELPH-MCNC: 3 G/DL — LOW (ref 3.5–5)
ALP SERPL-CCNC: 54 U/L — SIGNIFICANT CHANGE UP (ref 40–120)
ALT FLD-CCNC: 27 U/L DA — SIGNIFICANT CHANGE UP (ref 10–60)
ANION GAP SERPL CALC-SCNC: 8 MMOL/L — SIGNIFICANT CHANGE UP (ref 5–17)
AST SERPL-CCNC: 21 U/L — SIGNIFICANT CHANGE UP (ref 10–40)
BASOPHILS # BLD AUTO: 0.03 K/UL — SIGNIFICANT CHANGE UP (ref 0–0.2)
BASOPHILS NFR BLD AUTO: 0.7 % — SIGNIFICANT CHANGE UP (ref 0–2)
BILIRUB SERPL-MCNC: 0.8 MG/DL — SIGNIFICANT CHANGE UP (ref 0.2–1.2)
BUN SERPL-MCNC: 25 MG/DL — HIGH (ref 7–18)
CALCIUM SERPL-MCNC: 8.7 MG/DL — SIGNIFICANT CHANGE UP (ref 8.4–10.5)
CHLORIDE SERPL-SCNC: 109 MMOL/L — HIGH (ref 96–108)
CO2 SERPL-SCNC: 24 MMOL/L — SIGNIFICANT CHANGE UP (ref 22–31)
CREAT SERPL-MCNC: 1.49 MG/DL — HIGH (ref 0.5–1.3)
EOSINOPHIL # BLD AUTO: 0.26 K/UL — SIGNIFICANT CHANGE UP (ref 0–0.5)
EOSINOPHIL NFR BLD AUTO: 5.9 % — SIGNIFICANT CHANGE UP (ref 0–6)
GLUCOSE SERPL-MCNC: 91 MG/DL — SIGNIFICANT CHANGE UP (ref 70–99)
HCT VFR BLD CALC: 39.6 % — SIGNIFICANT CHANGE UP (ref 34.5–45)
HGB BLD-MCNC: 12.9 G/DL — SIGNIFICANT CHANGE UP (ref 11.5–15.5)
IMM GRANULOCYTES NFR BLD AUTO: 0 % — SIGNIFICANT CHANGE UP (ref 0–1.5)
LYMPHOCYTES # BLD AUTO: 2.01 K/UL — SIGNIFICANT CHANGE UP (ref 1–3.3)
LYMPHOCYTES # BLD AUTO: 45.5 % — HIGH (ref 13–44)
MAGNESIUM SERPL-MCNC: 2.2 MG/DL — SIGNIFICANT CHANGE UP (ref 1.6–2.6)
MCHC RBC-ENTMCNC: 31.5 PG — SIGNIFICANT CHANGE UP (ref 27–34)
MCHC RBC-ENTMCNC: 32.6 GM/DL — SIGNIFICANT CHANGE UP (ref 32–36)
MCV RBC AUTO: 96.6 FL — SIGNIFICANT CHANGE UP (ref 80–100)
MONOCYTES # BLD AUTO: 0.48 K/UL — SIGNIFICANT CHANGE UP (ref 0–0.9)
MONOCYTES NFR BLD AUTO: 10.9 % — SIGNIFICANT CHANGE UP (ref 2–14)
NEUTROPHILS # BLD AUTO: 1.64 K/UL — LOW (ref 1.8–7.4)
NEUTROPHILS NFR BLD AUTO: 37 % — LOW (ref 43–77)
NRBC # BLD: 0 /100 WBCS — SIGNIFICANT CHANGE UP (ref 0–0)
PHOSPHATE SERPL-MCNC: 3.6 MG/DL — SIGNIFICANT CHANGE UP (ref 2.5–4.5)
PLATELET # BLD AUTO: 125 K/UL — LOW (ref 150–400)
POTASSIUM SERPL-MCNC: 4 MMOL/L — SIGNIFICANT CHANGE UP (ref 3.5–5.3)
POTASSIUM SERPL-SCNC: 4 MMOL/L — SIGNIFICANT CHANGE UP (ref 3.5–5.3)
PROT SERPL-MCNC: 6.9 G/DL — SIGNIFICANT CHANGE UP (ref 6–8.3)
RBC # BLD: 4.1 M/UL — SIGNIFICANT CHANGE UP (ref 3.8–5.2)
RBC # FLD: 15.7 % — HIGH (ref 10.3–14.5)
SODIUM SERPL-SCNC: 141 MMOL/L — SIGNIFICANT CHANGE UP (ref 135–145)
WBC # BLD: 4.42 K/UL — SIGNIFICANT CHANGE UP (ref 3.8–10.5)
WBC # FLD AUTO: 4.42 K/UL — SIGNIFICANT CHANGE UP (ref 3.8–10.5)

## 2021-06-21 PROCEDURE — 99233 SBSQ HOSP IP/OBS HIGH 50: CPT | Mod: GC

## 2021-06-21 RX ORDER — LANOLIN ALCOHOL/MO/W.PET/CERES
5 CREAM (GRAM) TOPICAL ONCE
Refills: 0 | Status: COMPLETED | OUTPATIENT
Start: 2021-06-21 | End: 2021-06-21

## 2021-06-21 RX ADMIN — Medication 81 MILLIGRAM(S): at 12:17

## 2021-06-21 RX ADMIN — Medication 240 MILLIGRAM(S): at 06:21

## 2021-06-21 RX ADMIN — DRONEDARONE 400 MILLIGRAM(S): 400 TABLET, FILM COATED ORAL at 06:21

## 2021-06-21 RX ADMIN — HEPARIN SODIUM 5000 UNIT(S): 5000 INJECTION INTRAVENOUS; SUBCUTANEOUS at 06:21

## 2021-06-21 RX ADMIN — HEPARIN SODIUM 5000 UNIT(S): 5000 INJECTION INTRAVENOUS; SUBCUTANEOUS at 15:19

## 2021-06-21 RX ADMIN — PANTOPRAZOLE SODIUM 40 MILLIGRAM(S): 20 TABLET, DELAYED RELEASE ORAL at 06:21

## 2021-06-21 RX ADMIN — DRONEDARONE 400 MILLIGRAM(S): 400 TABLET, FILM COATED ORAL at 17:49

## 2021-06-21 NOTE — PROGRESS NOTE ADULT - PROBLEM SELECTOR PLAN 2
-EKG showed Afib with RVR , QTC prolonged 526  -cardizem to be uptitrated due to breakthrough Afib up to 140s on telemetry  -cardizem CD 240mg daily starting 6/21  -echocardiogram showing normal EF with moderate mitral regurg  - troponins trended down   -monitor on telemetry  -Cardiology Dr Lopez -EKG showed Afib with RVR , QTC prolonged 526  -cardizem to be uptitrated due to breakthrough Afib up to 140s on telemetry  -cardizem CD 240mg daily   -echocardiogram showing normal EF with moderate mitral regurg  - troponins trended down   -monitor on telemetry  -Cardiology Dr Lopez

## 2021-06-21 NOTE — DIETITIAN INITIAL EVALUATION ADULT. - PERTINENT LABORATORY DATA
06-21 Na141 mmol/L Glu 91 mg/dL K+ 4.0 mmol/L Cr  1.49 mg/dL<H> BUN 25 mg/dL<H> 06-21 Phos 3.6 mg/dL 06-21 Alb 3.0 g/dL<L> 06-19 Chol 110 mg/dL LDL --    HDL 48 mg/dL<L> Trig 70 mg/dL

## 2021-06-21 NOTE — PROGRESS NOTE ADULT - SUBJECTIVE AND OBJECTIVE BOX
PGY-1 Progress Note discussed with attending    PAGER #: [[phone]] TILL 5:00 PM  PLEASE CONTACT ON CALL TEAM:  - On Call Team (Please refer to Majo) FROM 5:00 PM - 8:30PM  - Nightfloat Team FROM 8:30 -7:30 AM    CHIEF COMPLAINT & BRIEF HOSPITAL COURSE:    INTERVAL HPI/OVERNIGHT EVENTS:       REVIEW OF SYSTEMS:  CONSTITUTIONAL: No fever, weight loss, or fatigue  RESPIRATORY: No cough, wheezing, chills or hemoptysis; No shortness of breath  CARDIOVASCULAR: No chest pain, palpitations, dizziness, or leg swelling  GASTROINTESTINAL: No abdominal pain. No nausea, vomiting, or hematemesis; No diarrhea or constipation. No melena or hematochezia.  GENITOURINARY: No dysuria or hematuria, urinary frequency  NEUROLOGICAL: No headaches, memory loss, loss of strength, numbness, or tremors  SKIN: No itching, burning, rashes, or lesions     MEDICATIONS  (STANDING):  aspirin  chewable 81 milliGRAM(s) Oral daily  atorvastatin 40 milliGRAM(s) Oral at bedtime  diltiazem    milliGRAM(s) Oral daily  dronedarone 400 milliGRAM(s) Oral two times a day  heparin   Injectable 5000 Unit(s) SubCutaneous every 8 hours  pantoprazole    Tablet 40 milliGRAM(s) Oral before breakfast  sodium chloride 0.9%. 1000 milliLiter(s) (50 mL/Hr) IV Continuous <Continuous>    MEDICATIONS  (PRN):      Vital Signs Last 24 Hrs  T(C): 36.4 (21 Jun 2021 07:35), Max: 36.6 (20 Jun 2021 15:41)  T(F): 97.5 (21 Jun 2021 07:35), Max: 97.9 (20 Jun 2021 15:41)  HR: 73 (21 Jun 2021 07:35) (71 - 85)  BP: 152/98 (21 Jun 2021 07:35) (128/73 - 153/90)  BP(mean): --  RR: 18 (21 Jun 2021 07:35) (17 - 18)  SpO2: 98% (21 Jun 2021 07:35) (98% - 100%)    PHYSICAL EXAMINATION:  GENERAL: NAD, well built  HEAD:  Atraumatic, Normocephalic  EYES:  conjunctiva and sclera clear  NECK: Supple, No JVD, Normal thyroid  CHEST/LUNG: Clear to auscultation. Clear to percussion bilaterally; No rales, rhonchi, wheezing, or rubs  HEART: Regular rate and rhythm; No murmurs, rubs, or gallops  ABDOMEN: Soft, Nontender, Nondistended; Bowel sounds present  NERVOUS SYSTEM:  Alert & Oriented X3,    EXTREMITIES:  2+ Peripheral Pulses, No clubbing, cyanosis, or edema  SKIN: warm dry                          12.9   4.42  )-----------( 125      ( 21 Jun 2021 05:55 )             39.6     06-21    141  |  109<H>  |  25<H>  ----------------------------<  91  4.0   |  24  |  1.49<H>    Ca    8.7      21 Jun 2021 05:55  Phos  3.6     06-21  Mg     2.2     06-21    TPro  6.9  /  Alb  3.0<L>  /  TBili  0.8  /  DBili  x   /  AST  21  /  ALT  27  /  AlkPhos  54  06-21    LIVER FUNCTIONS - ( 21 Jun 2021 05:55 )  Alb: 3.0 g/dL / Pro: 6.9 g/dL / ALK PHOS: 54 U/L / ALT: 27 U/L DA / AST: 21 U/L / GGT: x           CARDIAC MARKERS ( 20 Jun 2021 08:12 )  x     / x     / 256 U/L / x     / x                  CAPILLARY BLOOD GLUCOSE  CAPILLARY BLOOD GLUCOSE      POCT Blood Glucose.: 126 mg/dL (19 Jun 2021 11:56)    CAPILLARY BLOOD GLUCOSE          RADIOLOGY & ADDITIONAL TESTS:                   PGY-1 Progress Note discussed with attending    PAGER #: [[phone]] TILL 5:00 PM  PLEASE CONTACT ON CALL TEAM:  - On Call Team (Please refer to Majo) FROM 5:00 PM - 8:30PM  - Nightfloat Team FROM 8:30 -7:30 AM        INTERVAL HPI/OVERNIGHT EVENTS:   patient examined bedside, she is comfortable, NAD, Heart rate not well controlled , will continue to monitor , cardiazem increased from 180 to 240     REVIEW OF SYSTEMS:  CONSTITUTIONAL: No fever, weight loss, or fatigue  RESPIRATORY: No cough, wheezing, chills or hemoptysis; No shortness of breath  CARDIOVASCULAR: No chest pain, palpitations, dizziness, or leg swelling  GASTROINTESTINAL: No abdominal pain. No nausea, vomiting, or hematemesis; No diarrhea or constipation. No melena or hematochezia.  GENITOURINARY: No dysuria or hematuria, urinary frequency  NEUROLOGICAL: No headaches, memory loss, loss of strength, numbness, or tremors  SKIN: No itching, burning, rashes, or lesions     MEDICATIONS  (STANDING):  aspirin  chewable 81 milliGRAM(s) Oral daily  atorvastatin 40 milliGRAM(s) Oral at bedtime  diltiazem    milliGRAM(s) Oral daily  dronedarone 400 milliGRAM(s) Oral two times a day  heparin   Injectable 5000 Unit(s) SubCutaneous every 8 hours  pantoprazole    Tablet 40 milliGRAM(s) Oral before breakfast  sodium chloride 0.9%. 1000 milliLiter(s) (50 mL/Hr) IV Continuous <Continuous>    MEDICATIONS  (PRN):      Vital Signs Last 24 Hrs  T(C): 36.4 (21 Jun 2021 07:35), Max: 36.6 (20 Jun 2021 15:41)  T(F): 97.5 (21 Jun 2021 07:35), Max: 97.9 (20 Jun 2021 15:41)  HR: 73 (21 Jun 2021 07:35) (71 - 85)  BP: 152/98 (21 Jun 2021 07:35) (128/73 - 153/90)  BP(mean): --  RR: 18 (21 Jun 2021 07:35) (17 - 18)  SpO2: 98% (21 Jun 2021 07:35) (98% - 100%)    PHYSICAL EXAMINATION:  GENERAL: NAD,   HEAD:  Atraumatic, Normocephalic  EYES:  conjunctiva and sclera clear  NECK: Supple, No JVD, Normal thyroid  CHEST/LUNG: Clear to auscultation. Clear to percussion bilaterally; No rales, rhonchi, wheezing, or rubs  HEART: Regular rate and rhythm; No murmurs, rubs, or gallops  ABDOMEN: Soft, Nontender, Nondistended; Bowel sounds present  NERVOUS SYSTEM:  Alert & Oriented X3,    EXTREMITIES:  2+ Peripheral Pulses,mild LE oedema                           12.9   4.42  )-----------( 125      ( 21 Jun 2021 05:55 )             39.6     06-21    141  |  109<H>  |  25<H>  ----------------------------<  91  4.0   |  24  |  1.49<H>    Ca    8.7      21 Jun 2021 05:55  Phos  3.6     06-21  Mg     2.2     06-21    TPro  6.9  /  Alb  3.0<L>  /  TBili  0.8  /  DBili  x   /  AST  21  /  ALT  27  /  AlkPhos  54  06-21    LIVER FUNCTIONS - ( 21 Jun 2021 05:55 )  Alb: 3.0 g/dL / Pro: 6.9 g/dL / ALK PHOS: 54 U/L / ALT: 27 U/L DA / AST: 21 U/L / GGT: x           CARDIAC MARKERS ( 20 Jun 2021 08:12 )  x     / x     / 256 U/L / x     / x                  CAPILLARY BLOOD GLUCOSE  CAPILLARY BLOOD GLUCOSE      POCT Blood Glucose.: 126 mg/dL (19 Jun 2021 11:56)    CAPILLARY BLOOD GLUCOSE          RADIOLOGY & ADDITIONAL TESTS:

## 2021-06-21 NOTE — PROGRESS NOTE ADULT - ATTENDING COMMENTS
Patient seen/evaluated at bedside on 6/21/21 I agree with the resident progress note/outlined plan of care. My independent findings and conclusions are documented.    Patient denies lightheadedness, palpitations, dizziness. Daughter at bedside expresses concern for progressive lower extremity edema. Pt denies orthopnea, PND, SOB. Of note, patient was on cardizem 60mg q 6 hours yesterday which is the equivalent to 240mg daily dose    PE significant for   AAOx2  irreg/irreg  cTAB/l no accessory muscle use  no wheezes, rhonchi  1 + bilateral LE edema    1. atrial fibrillation with RVR  2. LE edema  3. dementia  4. suspected CKD Stage 3  5. prolonged QTC  6. left arm hematoma    c/w cardizem for now, may consider addition of beta  blocker. continue to monitor on telemetry  LE edema may be related to calcium channel blocker administration. No clinical symptoms of CHF  renal function remains stable  apply warm compress to left arm hematoma, if not resolved by tomorrow, assess with ultrasound  repeat EKG

## 2021-06-21 NOTE — DIETITIAN INITIAL EVALUATION ADULT. - OTHER INFO
Per Rn, patient is alert with periods of confusion , therefore , unable to use responses given by patient. Per Rn, patient is alert with periods of confusion , therefore , unable to use responses given by patient. Patient with DM in history but does not have DM per MD. Provide DASH/TLC diet as ordered. Per RN, patient consuming meals, No GI issues. Patient with heart failure but diet education deferred. Per Rn, patient is alert with periods of confusion , therefore , unable to use responses given by patient. Patient with DM in history but does not have DM per MD. Provide DASH/TLC diet as ordered. Per RN, patient consuming meals, No GI issues.

## 2021-06-21 NOTE — PROGRESS NOTE ADULT - PROBLEM SELECTOR PLAN 4
resolved - likely creatinine is elevated at baseline (1.42 on 6/6/2020)  - avoid nephrotoxic agents.

## 2021-06-22 ENCOUNTER — TRANSCRIPTION ENCOUNTER (OUTPATIENT)
Age: 81
End: 2021-06-22

## 2021-06-22 VITALS
TEMPERATURE: 98 F | DIASTOLIC BLOOD PRESSURE: 61 MMHG | OXYGEN SATURATION: 100 % | HEART RATE: 96 BPM | SYSTOLIC BLOOD PRESSURE: 127 MMHG | RESPIRATION RATE: 18 BRPM

## 2021-06-22 LAB
ALBUMIN SERPL ELPH-MCNC: 3.3 G/DL — LOW (ref 3.5–5)
ALP SERPL-CCNC: 64 U/L — SIGNIFICANT CHANGE UP (ref 40–120)
ALT FLD-CCNC: 45 U/L DA — SIGNIFICANT CHANGE UP (ref 10–60)
ANION GAP SERPL CALC-SCNC: 8 MMOL/L — SIGNIFICANT CHANGE UP (ref 5–17)
AST SERPL-CCNC: 52 U/L — HIGH (ref 10–40)
BILIRUB SERPL-MCNC: 0.8 MG/DL — SIGNIFICANT CHANGE UP (ref 0.2–1.2)
BUN SERPL-MCNC: 24 MG/DL — HIGH (ref 7–18)
CALCIUM SERPL-MCNC: 9 MG/DL — SIGNIFICANT CHANGE UP (ref 8.4–10.5)
CHLORIDE SERPL-SCNC: 110 MMOL/L — HIGH (ref 96–108)
CO2 SERPL-SCNC: 24 MMOL/L — SIGNIFICANT CHANGE UP (ref 22–31)
CREAT SERPL-MCNC: 1.36 MG/DL — HIGH (ref 0.5–1.3)
GLUCOSE SERPL-MCNC: 84 MG/DL — SIGNIFICANT CHANGE UP (ref 70–99)
MAGNESIUM SERPL-MCNC: 2.3 MG/DL — SIGNIFICANT CHANGE UP (ref 1.6–2.6)
NT-PROBNP SERPL-SCNC: 1978 PG/ML — HIGH (ref 0–450)
PHOSPHATE SERPL-MCNC: 3.7 MG/DL — SIGNIFICANT CHANGE UP (ref 2.5–4.5)
POTASSIUM SERPL-MCNC: 4.3 MMOL/L — SIGNIFICANT CHANGE UP (ref 3.5–5.3)
POTASSIUM SERPL-SCNC: 4.3 MMOL/L — SIGNIFICANT CHANGE UP (ref 3.5–5.3)
PROT SERPL-MCNC: 7.1 G/DL — SIGNIFICANT CHANGE UP (ref 6–8.3)
SODIUM SERPL-SCNC: 142 MMOL/L — SIGNIFICANT CHANGE UP (ref 135–145)

## 2021-06-22 PROCEDURE — 82550 ASSAY OF CK (CPK): CPT

## 2021-06-22 PROCEDURE — 83036 HEMOGLOBIN GLYCOSYLATED A1C: CPT

## 2021-06-22 PROCEDURE — 82248 BILIRUBIN DIRECT: CPT

## 2021-06-22 PROCEDURE — 82553 CREATINE MB FRACTION: CPT

## 2021-06-22 PROCEDURE — 93306 TTE W/DOPPLER COMPLETE: CPT

## 2021-06-22 PROCEDURE — 84100 ASSAY OF PHOSPHORUS: CPT

## 2021-06-22 PROCEDURE — 83880 ASSAY OF NATRIURETIC PEPTIDE: CPT

## 2021-06-22 PROCEDURE — 86769 SARS-COV-2 COVID-19 ANTIBODY: CPT

## 2021-06-22 PROCEDURE — 99239 HOSP IP/OBS DSCHRG MGMT >30: CPT

## 2021-06-22 PROCEDURE — 80061 LIPID PANEL: CPT

## 2021-06-22 PROCEDURE — 87635 SARS-COV-2 COVID-19 AMP PRB: CPT

## 2021-06-22 PROCEDURE — 97162 PT EVAL MOD COMPLEX 30 MIN: CPT

## 2021-06-22 PROCEDURE — 99285 EMERGENCY DEPT VISIT HI MDM: CPT | Mod: 25

## 2021-06-22 PROCEDURE — 71045 X-RAY EXAM CHEST 1 VIEW: CPT

## 2021-06-22 PROCEDURE — 83735 ASSAY OF MAGNESIUM: CPT

## 2021-06-22 PROCEDURE — 84443 ASSAY THYROID STIM HORMONE: CPT

## 2021-06-22 PROCEDURE — 93971 EXTREMITY STUDY: CPT | Mod: 26,LT

## 2021-06-22 PROCEDURE — 70450 CT HEAD/BRAIN W/O DYE: CPT

## 2021-06-22 PROCEDURE — 85025 COMPLETE CBC W/AUTO DIFF WBC: CPT

## 2021-06-22 PROCEDURE — 82607 VITAMIN B-12: CPT

## 2021-06-22 PROCEDURE — 93971 EXTREMITY STUDY: CPT

## 2021-06-22 PROCEDURE — 82962 GLUCOSE BLOOD TEST: CPT

## 2021-06-22 PROCEDURE — 80053 COMPREHEN METABOLIC PANEL: CPT

## 2021-06-22 PROCEDURE — 36415 COLL VENOUS BLD VENIPUNCTURE: CPT

## 2021-06-22 PROCEDURE — 82247 BILIRUBIN TOTAL: CPT

## 2021-06-22 PROCEDURE — 82746 ASSAY OF FOLIC ACID SERUM: CPT

## 2021-06-22 PROCEDURE — 93005 ELECTROCARDIOGRAM TRACING: CPT

## 2021-06-22 PROCEDURE — 84484 ASSAY OF TROPONIN QUANT: CPT

## 2021-06-22 RX ORDER — DILTIAZEM HCL 120 MG
1 CAPSULE, EXT RELEASE 24 HR ORAL
Qty: 0 | Refills: 0 | DISCHARGE
Start: 2021-06-22

## 2021-06-22 RX ORDER — DILTIAZEM HCL 120 MG
1 CAPSULE, EXT RELEASE 24 HR ORAL
Qty: 0 | Refills: 0 | DISCHARGE

## 2021-06-22 RX ORDER — CHOLECALCIFEROL (VITAMIN D3) 125 MCG
0 CAPSULE ORAL
Qty: 0 | Refills: 0 | DISCHARGE

## 2021-06-22 RX ORDER — FERROUS SULFATE 325(65) MG
1 TABLET ORAL
Qty: 0 | Refills: 0 | DISCHARGE

## 2021-06-22 RX ORDER — DILTIAZEM HCL 120 MG
1 CAPSULE, EXT RELEASE 24 HR ORAL
Qty: 30 | Refills: 0
Start: 2021-06-22 | End: 2021-07-21

## 2021-06-22 RX ORDER — CHOLECALCIFEROL (VITAMIN D3) 125 MCG
1 CAPSULE ORAL
Qty: 30 | Refills: 0
Start: 2021-06-22 | End: 2021-07-21

## 2021-06-22 RX ADMIN — PANTOPRAZOLE SODIUM 40 MILLIGRAM(S): 20 TABLET, DELAYED RELEASE ORAL at 05:33

## 2021-06-22 RX ADMIN — Medication 240 MILLIGRAM(S): at 05:33

## 2021-06-22 RX ADMIN — DRONEDARONE 400 MILLIGRAM(S): 400 TABLET, FILM COATED ORAL at 05:33

## 2021-06-22 RX ADMIN — DRONEDARONE 400 MILLIGRAM(S): 400 TABLET, FILM COATED ORAL at 17:23

## 2021-06-22 NOTE — DISCHARGE NOTE NURSING/CASE MANAGEMENT/SOCIAL WORK - PATIENT PORTAL LINK FT
You can access the FollowMyHealth Patient Portal offered by [facility] by registering at the following website: [email or web address]. By joining [facility]’s FollowMyHealth portal, you will also be able to view your health information using other applications (apps) compatible with our system.

## 2021-06-22 NOTE — PROGRESS NOTE ADULT - ASSESSMENT
Patient, an 81 year old female with a PMH of short-term memory loss, HTN, HLD, Permanent Atrial Fibrillation (on Diltiazem and Multaq, Not on AC) who was BIBEMS due to generalized weakness. Admitted for Afib with RVR and concern for syncope due to unwitnessed fall. 
Patient, an 81 year old female with a PMH of short-term memory loss, HTN, HLD, Permanent Atrial Fibrillation (on Diltiazem and Multaq, Not on AC) who was BIBEMS due to generalized weakness. Admitted for Afib with RVR and concern for syncope.

## 2021-06-22 NOTE — PROGRESS NOTE ADULT - PROBLEM SELECTOR PLAN 5
-c/w home medication cardizem  -monitor BP
-c/w home medication cardizem, adjusting as above  -monitor BP

## 2021-06-22 NOTE — PROGRESS NOTE ADULT - SUBJECTIVE AND OBJECTIVE BOX
PGY-1 Progress Note discussed with attending    PAGER #: [[phone]] TILL 5:00 PM  PLEASE CONTACT ON CALL TEAM:  - On Call Team (Please refer to Majo) FROM 5:00 PM - 8:30PM  - Nightfloat Team FROM 8:30 -7:30 AM    CHIEF COMPLAINT & BRIEF HOSPITAL COURSE:    INTERVAL HPI/OVERNIGHT EVENTS:       REVIEW OF SYSTEMS:  CONSTITUTIONAL: No fever, weight loss, or fatigue  RESPIRATORY: No cough, wheezing, chills or hemoptysis; No shortness of breath  CARDIOVASCULAR: No chest pain, palpitations, dizziness, or leg swelling  GASTROINTESTINAL: No abdominal pain. No nausea, vomiting, or hematemesis; No diarrhea or constipation. No melena or hematochezia.  GENITOURINARY: No dysuria or hematuria, urinary frequency  NEUROLOGICAL: No headaches, memory loss, loss of strength, numbness, or tremors  SKIN: No itching, burning, rashes, or lesions     MEDICATIONS  (STANDING):  aspirin  chewable 81 milliGRAM(s) Oral daily  atorvastatin 40 milliGRAM(s) Oral at bedtime  diltiazem    milliGRAM(s) Oral daily  dronedarone 400 milliGRAM(s) Oral two times a day  heparin   Injectable 5000 Unit(s) SubCutaneous every 8 hours  pantoprazole    Tablet 40 milliGRAM(s) Oral before breakfast  sodium chloride 0.9%. 1000 milliLiter(s) (50 mL/Hr) IV Continuous <Continuous>    MEDICATIONS  (PRN):      Vital Signs Last 24 Hrs  T(C): 36.3 (22 Jun 2021 07:37), Max: 36.6 (21 Jun 2021 15:57)  T(F): 97.4 (22 Jun 2021 07:37), Max: 97.9 (21 Jun 2021 15:57)  HR: 70 (22 Jun 2021 07:37) (53 - 95)  BP: 151/99 (22 Jun 2021 07:37) (121/82 - 155/91)  BP(mean): --  RR: 18 (22 Jun 2021 07:37) (17 - 18)  SpO2: 99% (22 Jun 2021 07:37) (97% - 100%)    PHYSICAL EXAMINATION:  GENERAL: NAD, well built  HEAD:  Atraumatic, Normocephalic  EYES:  conjunctiva and sclera clear  NECK: Supple, No JVD, Normal thyroid  CHEST/LUNG: Clear to auscultation. Clear to percussion bilaterally; No rales, rhonchi, wheezing, or rubs  HEART: Regular rate and rhythm; No murmurs, rubs, or gallops  ABDOMEN: Soft, Nontender, Nondistended; Bowel sounds present  NERVOUS SYSTEM:  Alert & Oriented X3,    EXTREMITIES:  2+ Peripheral Pulses, No clubbing, cyanosis, or edema  SKIN: warm dry                          12.9   4.42  )-----------( 125      ( 21 Jun 2021 05:55 )             39.6     06-22    142  |  110<H>  |  24<H>  ----------------------------<  84  4.3   |  24  |  1.36<H>    Ca    9.0      22 Jun 2021 06:44  Phos  3.7     06-22  Mg     2.3     06-22    TPro  7.1  /  Alb  3.3<L>  /  TBili  0.8  /  DBili  x   /  AST  52<H>  /  ALT  45  /  AlkPhos  64  06-22    LIVER FUNCTIONS - ( 22 Jun 2021 06:44 )  Alb: 3.3 g/dL / Pro: 7.1 g/dL / ALK PHOS: 64 U/L / ALT: 45 U/L DA / AST: 52 U/L / GGT: x                       CAPILLARY BLOOD GLUCOSE  CAPILLARY BLOOD GLUCOSE        CAPILLARY BLOOD GLUCOSE          RADIOLOGY & ADDITIONAL TESTS:                   PGY-1 Progress Note discussed with attending    PAGER #: [[phone]] TILL 5:00 PM  PLEASE CONTACT ON CALL TEAM:  - On Call Team (Please refer to Majo) FROM 5:00 PM - 8:30PM  - Nightfloat Team FROM 8:30 -7:30 AM        INTERVAL HPI/OVERNIGHT EVENTS:     Patient examined bedside, she is comfortable , not dizzy , denies any chest pain , SOB or palpitations , heart rate not well controlled yet .     REVIEW OF SYSTEMS:  CONSTITUTIONAL: No fever, weight loss, or fatigue  RESPIRATORY: No cough, wheezing, chills or hemoptysis; No shortness of breath  CARDIOVASCULAR: No chest pain, palpitations, dizziness, or leg swelling  GASTROINTESTINAL: No abdominal pain. No nausea, vomiting, or hematemesis; No diarrhea or constipation. No melena or hematochezia.  GENITOURINARY: No dysuria or hematuria, urinary frequency  NEUROLOGICAL: No headaches, memory loss, loss of strength, numbness, or tremors  SKIN: No itching, burning, rashes, or lesions     MEDICATIONS  (STANDING):  aspirin  chewable 81 milliGRAM(s) Oral daily  atorvastatin 40 milliGRAM(s) Oral at bedtime  diltiazem    milliGRAM(s) Oral daily  dronedarone 400 milliGRAM(s) Oral two times a day  heparin   Injectable 5000 Unit(s) SubCutaneous every 8 hours  pantoprazole    Tablet 40 milliGRAM(s) Oral before breakfast  sodium chloride 0.9%. 1000 milliLiter(s) (50 mL/Hr) IV Continuous <Continuous>    MEDICATIONS  (PRN):      Vital Signs Last 24 Hrs  T(C): 36.3 (22 Jun 2021 07:37), Max: 36.6 (21 Jun 2021 15:57)  T(F): 97.4 (22 Jun 2021 07:37), Max: 97.9 (21 Jun 2021 15:57)  HR: 70 (22 Jun 2021 07:37) (53 - 95)  BP: 151/99 (22 Jun 2021 07:37) (121/82 - 155/91)  BP(mean): --  RR: 18 (22 Jun 2021 07:37) (17 - 18)  SpO2: 99% (22 Jun 2021 07:37) (97% - 100%)    PHYSICAL EXAMINATION:  GENERAL: NAD  HEAD:  Atraumatic, Normocephalic  EYES:  conjunctiva and sclera clear  NECK: Supple, No JVD, Normal thyroid  CHEST/LUNG: Clear to auscultation. Clear to percussion bilaterally; No rales, rhonchi, wheezing, or rubs  HEART: Regular rate and rhythm; No murmurs, rubs, or gallops  ABDOMEN: Soft, Nontender, Nondistended; Bowel sounds present  NERVOUS SYSTEM:  Alert & Oriented X3,    EXTREMITIES:  2+ Peripheral Pulses, No clubbing, cyanosis, or edema  SKIN: warm dry                          12.9   4.42  )-----------( 125      ( 21 Jun 2021 05:55 )             39.6     06-22    142  |  110<H>  |  24<H>  ----------------------------<  84  4.3   |  24  |  1.36<H>    Ca    9.0      22 Jun 2021 06:44  Phos  3.7     06-22  Mg     2.3     06-22    TPro  7.1  /  Alb  3.3<L>  /  TBili  0.8  /  DBili  x   /  AST  52<H>  /  ALT  45  /  AlkPhos  64  06-22    LIVER FUNCTIONS - ( 22 Jun 2021 06:44 )  Alb: 3.3 g/dL / Pro: 7.1 g/dL / ALK PHOS: 64 U/L / ALT: 45 U/L DA / AST: 52 U/L / GGT: x                       CAPILLARY BLOOD GLUCOSE  CAPILLARY BLOOD GLUCOSE        CAPILLARY BLOOD GLUCOSE          RADIOLOGY & ADDITIONAL TESTS:                   PGY-1 Progress Note discussed with attending    PAGER #: [[phone]] TILL 5:00 PM  PLEASE CONTACT ON CALL TEAM:  - On Call Team (Please refer to Majo) FROM 5:00 PM - 8:30PM  - Nightfloat Team FROM 8:30 -7:30 AM        INTERVAL HPI/OVERNIGHT EVENTS:     Patient examined bedside, she is comfortable , not dizzy , denies any chest pain , SOB or palpitations , heart rate likely  controlled .     REVIEW OF SYSTEMS:  CONSTITUTIONAL: No fever, weight loss, or fatigue  RESPIRATORY: No cough, wheezing, chills or hemoptysis; No shortness of breath  CARDIOVASCULAR: No chest pain, palpitations, dizziness, or leg swelling  GASTROINTESTINAL: No abdominal pain. No nausea, vomiting, or hematemesis; No diarrhea or constipation. No melena or hematochezia.  GENITOURINARY: No dysuria or hematuria, urinary frequency  NEUROLOGICAL: No headaches, memory loss, loss of strength, numbness, or tremors  SKIN: No itching, burning, rashes, or lesions     MEDICATIONS  (STANDING):  aspirin  chewable 81 milliGRAM(s) Oral daily  atorvastatin 40 milliGRAM(s) Oral at bedtime  diltiazem    milliGRAM(s) Oral daily  dronedarone 400 milliGRAM(s) Oral two times a day  heparin   Injectable 5000 Unit(s) SubCutaneous every 8 hours  pantoprazole    Tablet 40 milliGRAM(s) Oral before breakfast  sodium chloride 0.9%. 1000 milliLiter(s) (50 mL/Hr) IV Continuous <Continuous>    MEDICATIONS  (PRN):      Vital Signs Last 24 Hrs  T(C): 36.3 (22 Jun 2021 07:37), Max: 36.6 (21 Jun 2021 15:57)  T(F): 97.4 (22 Jun 2021 07:37), Max: 97.9 (21 Jun 2021 15:57)  HR: 70 (22 Jun 2021 07:37) (53 - 95)  BP: 151/99 (22 Jun 2021 07:37) (121/82 - 155/91)  BP(mean): --  RR: 18 (22 Jun 2021 07:37) (17 - 18)  SpO2: 99% (22 Jun 2021 07:37) (97% - 100%)    PHYSICAL EXAMINATION:  GENERAL: NAD  HEAD:  Atraumatic, Normocephalic  EYES:  conjunctiva and sclera clear  NECK: Supple, No JVD, Normal thyroid  CHEST/LUNG: Clear to auscultation. Clear to percussion bilaterally; No rales, rhonchi, wheezing, or rubs  HEART: Regular rate and rhythm; No murmurs, rubs, or gallops  ABDOMEN: Soft, Nontender, Nondistended; Bowel sounds present  NERVOUS SYSTEM:  Alert & Oriented X3,    EXTREMITIES:  2+ Peripheral Pulses, No clubbing, cyanosis, or edema  SKIN: warm dry                          12.9   4.42  )-----------( 125      ( 21 Jun 2021 05:55 )             39.6     06-22    142  |  110<H>  |  24<H>  ----------------------------<  84  4.3   |  24  |  1.36<H>    Ca    9.0      22 Jun 2021 06:44  Phos  3.7     06-22  Mg     2.3     06-22    TPro  7.1  /  Alb  3.3<L>  /  TBili  0.8  /  DBili  x   /  AST  52<H>  /  ALT  45  /  AlkPhos  64  06-22    LIVER FUNCTIONS - ( 22 Jun 2021 06:44 )  Alb: 3.3 g/dL / Pro: 7.1 g/dL / ALK PHOS: 64 U/L / ALT: 45 U/L DA / AST: 52 U/L / GGT: x                       CAPILLARY BLOOD GLUCOSE  CAPILLARY BLOOD GLUCOSE        CAPILLARY BLOOD GLUCOSE          RADIOLOGY & ADDITIONAL TESTS:                   PGY-1 Progress Note discussed with attending    PAGER #: [[phone]] TILL 5:00 PM  PLEASE CONTACT ON CALL TEAM:  - On Call Team (Please refer to Majo) FROM 5:00 PM - 8:30PM  - Nightfloat Team FROM 8:30 -7:30 AM        INTERVAL HPI/OVERNIGHT EVENTS:     Patient examined bedside, she is comfortable , not dizzy , denies any chest pain , SOB or palpitations , heart rate likely  controlled . CM for HHA     REVIEW OF SYSTEMS:  CONSTITUTIONAL: No fever, weight loss, or fatigue  RESPIRATORY: No cough, wheezing, chills or hemoptysis; No shortness of breath  CARDIOVASCULAR: No chest pain, palpitations, dizziness, or leg swelling  GASTROINTESTINAL: No abdominal pain. No nausea, vomiting, or hematemesis; No diarrhea or constipation. No melena or hematochezia.  GENITOURINARY: No dysuria or hematuria, urinary frequency  NEUROLOGICAL: No headaches, memory loss, loss of strength, numbness, or tremors  SKIN: No itching, burning, rashes, or lesions     MEDICATIONS  (STANDING):  aspirin  chewable 81 milliGRAM(s) Oral daily  atorvastatin 40 milliGRAM(s) Oral at bedtime  diltiazem    milliGRAM(s) Oral daily  dronedarone 400 milliGRAM(s) Oral two times a day  heparin   Injectable 5000 Unit(s) SubCutaneous every 8 hours  pantoprazole    Tablet 40 milliGRAM(s) Oral before breakfast  sodium chloride 0.9%. 1000 milliLiter(s) (50 mL/Hr) IV Continuous <Continuous>    MEDICATIONS  (PRN):      Vital Signs Last 24 Hrs  T(C): 36.3 (22 Jun 2021 07:37), Max: 36.6 (21 Jun 2021 15:57)  T(F): 97.4 (22 Jun 2021 07:37), Max: 97.9 (21 Jun 2021 15:57)  HR: 70 (22 Jun 2021 07:37) (53 - 95)  BP: 151/99 (22 Jun 2021 07:37) (121/82 - 155/91)  BP(mean): --  RR: 18 (22 Jun 2021 07:37) (17 - 18)  SpO2: 99% (22 Jun 2021 07:37) (97% - 100%)    PHYSICAL EXAMINATION:  GENERAL: NAD  HEAD:  Atraumatic, Normocephalic  EYES:  conjunctiva and sclera clear  NECK: Supple, No JVD, Normal thyroid  CHEST/LUNG: Clear to auscultation. Clear to percussion bilaterally; No rales, rhonchi, wheezing, or rubs  HEART: Regular rate and rhythm; No murmurs, rubs, or gallops  ABDOMEN: Soft, Nontender, Nondistended; Bowel sounds present  NERVOUS SYSTEM:  Alert & Oriented X2,  highly forgetting  EXTREMITIES:  2+ Peripheral Pulses,1+ pitting oedema  SKIN: warm dry                          12.9   4.42  )-----------( 125      ( 21 Jun 2021 05:55 )             39.6     06-22    142  |  110<H>  |  24<H>  ----------------------------<  84  4.3   |  24  |  1.36<H>    Ca    9.0      22 Jun 2021 06:44  Phos  3.7     06-22  Mg     2.3     06-22    TPro  7.1  /  Alb  3.3<L>  /  TBili  0.8  /  DBili  x   /  AST  52<H>  /  ALT  45  /  AlkPhos  64  06-22    LIVER FUNCTIONS - ( 22 Jun 2021 06:44 )  Alb: 3.3 g/dL / Pro: 7.1 g/dL / ALK PHOS: 64 U/L / ALT: 45 U/L DA / AST: 52 U/L / GGT: x                       CAPILLARY BLOOD GLUCOSE  CAPILLARY BLOOD GLUCOSE        CAPILLARY BLOOD GLUCOSE          RADIOLOGY & ADDITIONAL TESTS:

## 2021-06-22 NOTE — PROGRESS NOTE ADULT - PROBLEM SELECTOR PLAN 2
-EKG showed Afib with RVR , QTC prolonged 526  -cardizem to be uptitrated due to breakthrough Afib up to 140s on telemetry  -cardizem CD 240mg daily   -echocardiogram showing normal EF with moderate mitral regurg  - troponins trended down   -monitor on telemetry  -Cardiology Dr Lopez -EKG showed Afib with RVR , QTC prolonged 526  -cardizem to be uptitrated due to breakthrough Afib up to 140s on telemetry  -cardizem CD 240mg daily   -echocardiogram showing normal EF with moderate mitral regurg  - troponins trended down   -monitor on telemetry  -Cardiology Dr Lopez  - Heart rate not well controlled yet -EKG showed Afib with RVR , QTC prolonged 526  -cardizem to be uptitrated due to breakthrough Afib up to 140s on telemetry  -cardizem CD 240mg daily   -echocardiogram showing normal EF with moderate mitral regurg  - troponins trended down   -monitor on telemetry  -Cardiology Dr Lopez  - Heart rate likely controlled

## 2022-01-01 NOTE — DISCHARGE NOTE PROVIDER - NSDCHHATTENDCERT_GEN_ALL_CORE
My signature below certifies that the above stated patient is homebound and upon completion of the Face-To-Face encounter, has the need for intermittent skilled nursing, physical therapy and/or speech or occupational therapy services in their home for their current diagnosis as outlined in their initial plan of care. These services will continue to be monitored by myself or another physician.
prenatal chart

## 2022-08-18 NOTE — ED ADULT NURSE NOTE - EXTENSIONS OF SELF_ADULT
Vitals: I have reviewed the patients vital signs  General: Well dressed, well appearing, no acute distress  HEENT: Atraumatic, normocephalic, airway patent  Eyes: EOMI, tracking appropriately  Neck: no tracheal deviation, no JVD  Chest/Lungs: no trauma, symmetric chest rise, speaking in complete sentences, no WOB  Heart: skin and extremities well perfused, regular rate and rhythm  Neuro: A+Ox3, ambulating without difficulty, CN grossly intact  MSK: strength at baseline in all extremities, no muscle wasting or atrophy  Skin: significant lymphedema appreciated in both lower extremities, 1x1cm wound appreciated in the medial aspect of L lower extremity with mild erythema surrounding the wound but wound otherwise appears dry/clean with no tenderness to palpation, 1x1cm cut noted over the medial aspect of R lower extremity - not actively bleeding
None

## 2022-10-04 NOTE — DIETITIAN INITIAL EVALUATION ADULT. - NUTRITION DIAGNOSITC TERMINOLOGY #1
Eat healthy foods you enjoy. Rivaroxaban/Xarelto DOES NOT have a special diet. Limit your alcohol intake.
Overweight/Obesity

## 2023-02-27 NOTE — PATIENT PROFILE ADULT - DISASTER - TOBACCO USE
Never smoker Pt found running around in the bar with tea shirt and no shoes, p/t denies any alcohol or drug use, appears very anxious, refusing to answer questions, no signs of any trauma noted